# Patient Record
Sex: MALE | Race: WHITE | NOT HISPANIC OR LATINO | URBAN - METROPOLITAN AREA
[De-identification: names, ages, dates, MRNs, and addresses within clinical notes are randomized per-mention and may not be internally consistent; named-entity substitution may affect disease eponyms.]

---

## 2019-01-31 NOTE — PROGRESS NOTES
"SUBJECTIVE:   Simone Shepard is a 43 year old male and has had {NUMBERS 1-12:10} wart(s) for {NUMBERS 1-12:10} {TIME FRAME:9076} of the {LOCATION ON BODY - DERM WARTS:789884::\"***\"} and has tried {derm wart treatment:825670}.   The lesions are growing and occasionally painful.      Past Medical, social, family histories, medications, and allergies reviewed and updated    OBJECTIVE:    There were no vitals taken for this visit.  The patient appears today in no apparent distress and well nourished.    Exam reveals *** scattered skin colored to pink verrucous papules and plaques with black dots within consistent with verrucae on the { location on body - derm warts:678621::\"***\"}.      ASSESSMENT:  ***    PLAN:   All treatment options and potential side effects were discussed (including but not limited to: pain, vesicles/bullae formation, ring wart formation, recurrence, and need for retreatment). The fact that studies show the average number of treatments to get a wart to resolve is 5-6 treatments was discussed as well.  Discussed contacting insurance for treatment coverage.     The patient/parents elect to proceed with {derm wart treatment:024476}after paring down the lesions with a sterile scalpel blade.    Follow up  2 weeks    Gilberto Pope PA-C  "

## 2019-02-01 ENCOUNTER — OFFICE VISIT (OUTPATIENT)
Dept: FAMILY MEDICINE | Facility: CLINIC | Age: 44
End: 2019-02-01
Payer: COMMERCIAL

## 2019-02-01 VITALS
TEMPERATURE: 98.2 F | RESPIRATION RATE: 18 BRPM | OXYGEN SATURATION: 97 % | WEIGHT: 224 LBS | SYSTOLIC BLOOD PRESSURE: 112 MMHG | DIASTOLIC BLOOD PRESSURE: 74 MMHG | HEIGHT: 70 IN | HEART RATE: 68 BPM | BODY MASS INDEX: 32.07 KG/M2

## 2019-02-01 DIAGNOSIS — L84 CORN OR CALLUS: Primary | ICD-10-CM

## 2019-02-01 PROCEDURE — 17110 DESTRUCTION B9 LES UP TO 14: CPT | Performed by: PHYSICIAN ASSISTANT

## 2019-02-01 SDOH — HEALTH STABILITY: MENTAL HEALTH: HOW OFTEN DO YOU HAVE A DRINK CONTAINING ALCOHOL?: NEVER

## 2019-02-01 ASSESSMENT — MIFFLIN-ST. JEOR: SCORE: 1922.31

## 2019-02-01 ASSESSMENT — PAIN SCALES - GENERAL: PAINLEVEL: NO PAIN (1)

## 2019-02-01 NOTE — PROGRESS NOTES
"SUBJECTIVE:                                                    Simone Shepard is a 43 year old male who presents to clinic today for the following health issues:          Duration: weeks    Description (location/character/radiation): left foot 5th digit - lesion     Intensity:  Mild today     Accompanying signs and symptoms: was very painful but that has gotten better     History (similar episodes/previous evaluation): None    Precipitating or alleviating factors: None    Therapies tried and outcome: None       Problem list and histories reviewed & adjusted, as indicated.  Additional history: as documented    There is no problem list on file for this patient.    History reviewed. No pertinent surgical history.    Social History     Tobacco Use     Smoking status: Never Smoker     Smokeless tobacco: Never Used   Substance Use Topics     Alcohol use: Yes     Frequency: Never     Comment: rarely     Family History   Problem Relation Age of Onset     Lupus Mother          No current outpatient medications on file.     No Known Allergies    ROS:  Constitutional, HEENT, cardiovascular, pulmonary, gi and gu systems are negative, except as otherwise noted.    OBJECTIVE:     /74   Pulse 68   Temp 98.2  F (36.8  C) (Oral)   Resp 18   Ht 1.778 m (5' 10\")   Wt 101.6 kg (224 lb)   SpO2 97%   BMI 32.14 kg/m    Body mass index is 32.14 kg/m .  GENERAL: healthy, alert and no distress  Lateral aspect of left foot with very small corn    Diagnostic Test Results:  none     ASSESSMENT/PLAN:         ICD-10-CM    1. Corn or callus L84 DESTRUCT BENIGN LESION, UP TO 14      Corn shaved down with 15 blade. Use of liquid nitrogen was discussed. warning signs discussed. side effects discussed. It was used on skin lesion x 3.  varisol applied. Wash off in about 10 hrs if he can tolerate it.   Recheck 2 wks.     Gilberto Pope PA-C  [facility]    "

## 2020-03-03 ENCOUNTER — OFFICE VISIT (OUTPATIENT)
Dept: FAMILY MEDICINE | Facility: CLINIC | Age: 45
End: 2020-03-03
Payer: COMMERCIAL

## 2020-03-03 VITALS
OXYGEN SATURATION: 98 % | TEMPERATURE: 97.8 F | HEART RATE: 90 BPM | WEIGHT: 235 LBS | DIASTOLIC BLOOD PRESSURE: 72 MMHG | BODY MASS INDEX: 33.72 KG/M2 | SYSTOLIC BLOOD PRESSURE: 125 MMHG

## 2020-03-03 DIAGNOSIS — H00.11 CHALAZION OF RIGHT UPPER EYELID: Primary | ICD-10-CM

## 2020-03-03 PROCEDURE — 99213 OFFICE O/P EST LOW 20 MIN: CPT | Performed by: PHYSICIAN ASSISTANT

## 2020-03-03 NOTE — PROGRESS NOTES
Subjective     Simone Shepard is a 45 year old male who presents to clinic today for the following health issues:    HPI   Eye(s) Problem      Duration: Over a week. He has been using warm compresses as much as possible and there is a bump in the upper right eyelid. No drainage. Pressure / no actual pain.    Description:  Location: right  Pain: YES- weird pressure  Redness: YES  Discharge: YES / this morning watery.     Accompanying signs and symptoms:     History (Trauma, foreign body exposure,): None    Precipitating or alleviating factors (contact use): None    Therapies tried and outcome: None      There is no problem list on file for this patient.    History reviewed. No pertinent surgical history.    Social History     Tobacco Use     Smoking status: Never Smoker     Smokeless tobacco: Never Used   Substance Use Topics     Alcohol use: Yes     Frequency: Never     Comment: rarely     Family History   Problem Relation Age of Onset     Lupus Mother          No current outpatient medications on file.     No Known Allergies  BP Readings from Last 3 Encounters:   03/03/20 125/72   02/01/19 112/74    Wt Readings from Last 3 Encounters:   03/03/20 106.6 kg (235 lb)   02/01/19 101.6 kg (224 lb)                    Reviewed and updated as needed this visit by Provider         Review of Systems   ROS COMP: Constitutional, HEENT, cardiovascular, pulmonary, GI, , musculoskeletal, neuro, skin, endocrine and psych systems are negative, except as otherwise noted.      Objective    /72   Pulse 90   Temp 97.8  F (36.6  C) (Tympanic)   Wt 106.6 kg (235 lb)   SpO2 98%   BMI 33.72 kg/m    Body mass index is 33.72 kg/m .  Physical Exam   GENERAL: healthy, alert and no distress  EYES: right upper lid: red / swollen, there is a nodule present. No drainage.   Exam otherwise normal.   Left eye appears normal.     Diagnostic Test Results:  Labs reviewed in Epic        Assessment & Plan     1. Chalazion of right upper  eyelid  No improvement with warm compresses.   Referral given to schedule an appointment with Ophthalmology.   Continue warm compresses until he is seen.   - OPHTHALMOLOGY ADULT REFERRAL           Return in about 1 week (around 3/10/2020) for specialty / Ophthalmology, .    Kristen M. Kehr, PA-C  [facility]

## 2020-03-05 ENCOUNTER — OFFICE VISIT (OUTPATIENT)
Dept: OPHTHALMOLOGY | Facility: CLINIC | Age: 45
End: 2020-03-05
Payer: COMMERCIAL

## 2020-03-05 DIAGNOSIS — H00.11 CHALAZION OF RIGHT UPPER EYELID: Primary | ICD-10-CM

## 2020-03-05 PROCEDURE — 92002 INTRM OPH EXAM NEW PATIENT: CPT | Performed by: OPHTHALMOLOGY

## 2020-03-05 RX ORDER — CEPHALEXIN 500 MG/1
500 CAPSULE ORAL 2 TIMES DAILY
Qty: 20 CAPSULE | Refills: 0 | Status: SHIPPED | OUTPATIENT
Start: 2020-03-05 | End: 2020-06-11

## 2020-03-05 ASSESSMENT — VISUAL ACUITY
CORRECTION_TYPE: GLASSES
METHOD: SNELLEN - LINEAR
OD_CC: 20/20
OS_CC: 20/20
OD_CC+: -1

## 2020-03-05 ASSESSMENT — TONOMETRY
OD_IOP_MMHG: 14
IOP_METHOD: ICARE
OS_IOP_MMHG: 16

## 2020-03-05 ASSESSMENT — SLIT LAMP EXAM - LIDS: COMMENTS: NORMAL

## 2020-03-05 NOTE — PROGRESS NOTES
Current Eye Medications:  None. Has tried Warm compresses 1-2 times daily for about 10 minutes at a time, Doesn't seem to help.     Subjective:  Bump on right upper eyelid for a couple of weeks, changes in size. Vision is fine both eyes. No eye pain or discomfort in either eye.      Objective:  See Ophthalmology Exam.       Assessment:  Acute chalazion right upper eyelid.      Plan:   Start Keflex by mouth twice daily for 10 days.  Use warm packs on the eye for about 10 minutes a few times daily.  Lid hygiene discussed and encouraged for both eyes a few times daily as needed.   Return Visit End of Day Procedure (4:30) for Chalazion Incise and Drain if symptoms do not resolve after several weeks.    Fabio Dumas M.D.  [phone]

## 2020-03-05 NOTE — PATIENT INSTRUCTIONS
Start Keflex by mouth twice daily for 10 days.  Use warm packs on the eye for about 10 minutes a few times daily.  Lid hygiene discussed and encouraged for both eyes a few times daily as needed.   Return Visit End of Day Procedure (4:30) for Chalazion Incise and Drain if symptoms do not resolve after several weeks.    Fabio Dumas M.D.  [phone]

## 2020-03-05 NOTE — LETTER
3/5/2020         RE: Simone Shepard  [address]  [place] [address]        Dear Colleague,    Thank you for referring your patient, Simone Shepard, to the [facility]. Please see a copy of my visit note below.     Current Eye Medications:  None. Has tried Warm compresses 1-2 times daily for about 10 minutes at a time, Doesn't seem to help.     Subjective:  Bump on right upper eyelid for a couple of weeks, changes in size. Vision is fine both eyes. No eye pain or discomfort in either eye.      Objective:  See Ophthalmology Exam.       Assessment:  Acute chalazion right upper eyelid.      Plan:   Start Keflex by mouth twice daily for 10 days.  Use warm packs on the eye for about 10 minutes a few times daily.  Lid hygiene discussed and encouraged for both eyes a few times daily as needed.   Return Visit End of Day Procedure (4:30) for Chalazion Incise and Drain if symptoms do not resolve after several weeks.    Fabio Dumas M.D.  [phone]             Again, thank you for allowing me to participate in the care of your patient.        Sincerely,        Fabio Dumas MD

## 2020-03-06 ENCOUNTER — OFFICE VISIT (OUTPATIENT)
Dept: FAMILY MEDICINE | Facility: CLINIC | Age: 45
End: 2020-03-06
Payer: COMMERCIAL

## 2020-03-06 ENCOUNTER — ANCILLARY PROCEDURE (OUTPATIENT)
Dept: GENERAL RADIOLOGY | Facility: CLINIC | Age: 45
End: 2020-03-06
Attending: PHYSICIAN ASSISTANT
Payer: COMMERCIAL

## 2020-03-06 VITALS
TEMPERATURE: 98.7 F | SYSTOLIC BLOOD PRESSURE: 136 MMHG | HEIGHT: 70 IN | WEIGHT: 223 LBS | HEART RATE: 101 BPM | BODY MASS INDEX: 31.92 KG/M2 | OXYGEN SATURATION: 98 % | DIASTOLIC BLOOD PRESSURE: 83 MMHG

## 2020-03-06 DIAGNOSIS — B34.9 VIRAL SYNDROME: ICD-10-CM

## 2020-03-06 DIAGNOSIS — R05.9 COUGH: Primary | ICD-10-CM

## 2020-03-06 LAB
BASOPHILS # BLD AUTO: 0 10E9/L (ref 0–0.2)
BASOPHILS NFR BLD AUTO: 0.3 %
DIFFERENTIAL METHOD BLD: ABNORMAL
EOSINOPHIL # BLD AUTO: 0.1 10E9/L (ref 0–0.7)
EOSINOPHIL NFR BLD AUTO: 1 %
ERYTHROCYTE [DISTWIDTH] IN BLOOD BY AUTOMATED COUNT: 13.4 % (ref 10–15)
HCT VFR BLD AUTO: 48.8 % (ref 40–53)
HGB BLD-MCNC: 16.7 G/DL (ref 13.3–17.7)
LYMPHOCYTES # BLD AUTO: 2.3 10E9/L (ref 0.8–5.3)
LYMPHOCYTES NFR BLD AUTO: 19.8 %
MCH RBC QN AUTO: 29.7 PG (ref 26.5–33)
MCHC RBC AUTO-ENTMCNC: 34.2 G/DL (ref 31.5–36.5)
MCV RBC AUTO: 87 FL (ref 78–100)
MONOCYTES # BLD AUTO: 1.7 10E9/L (ref 0–1.3)
MONOCYTES NFR BLD AUTO: 14.5 %
NEUTROPHILS # BLD AUTO: 7.6 10E9/L (ref 1.6–8.3)
NEUTROPHILS NFR BLD AUTO: 64.4 %
PLATELET # BLD AUTO: 239 10E9/L (ref 150–450)
RBC # BLD AUTO: 5.63 10E12/L (ref 4.4–5.9)
WBC # BLD AUTO: 11.7 10E9/L (ref 4–11)

## 2020-03-06 PROCEDURE — 99213 OFFICE O/P EST LOW 20 MIN: CPT | Performed by: PHYSICIAN ASSISTANT

## 2020-03-06 PROCEDURE — 71046 X-RAY EXAM CHEST 2 VIEWS: CPT

## 2020-03-06 PROCEDURE — 36415 COLL VENOUS BLD VENIPUNCTURE: CPT | Performed by: PHYSICIAN ASSISTANT

## 2020-03-06 PROCEDURE — 85025 COMPLETE CBC W/AUTO DIFF WBC: CPT | Performed by: PHYSICIAN ASSISTANT

## 2020-03-06 ASSESSMENT — ENCOUNTER SYMPTOMS
CARDIOVASCULAR NEGATIVE: 1
DIAPHORESIS: 1
FATIGUE: 1
EYES NEGATIVE: 1
CHEST TIGHTNESS: 1
GASTROINTESTINAL NEGATIVE: 1
SHORTNESS OF BREATH: 0
MYALGIAS: 1
CHILLS: 1
SORE THROAT: 1
FEVER: 1

## 2020-03-06 ASSESSMENT — MIFFLIN-ST. JEOR: SCORE: 1907.77

## 2020-03-06 NOTE — PATIENT INSTRUCTIONS
"  Patient Education     Viral Syndrome (Adult)  A viral illness may cause a number of symptoms such as fever. Other symptoms depend on the part of the body that the virus affects. If it settles in your nose, throat, and lungs, it may cause cough, sore throat, congestion, runny nose, headache, earache and other ear symptoms, or shortness of breath. If it settles in your stomach and intestinal tract, it may cause nausea, vomiting, cramping, and diarrhea. Sometimes it causes generalized symptoms like \"aching all over,\" feeling tired, loss of energy, or loss of appetite.  A viral illness usually lasts anywhere from several days to several weeks, but sometimes it lasts longer. In some cases, a more serious infection can look like a viral syndrome in the first few days of the illness. You may need another exam and additional tests to know the difference. Watch for the warning signs listed below for when to seek medical advice.  Home care  Follow these guidelines for taking care of yourself at home:    If symptoms are severe, rest at home for the first 2 to 3 days.    Stay away from cigarette smoke - both your smoke and the smoke from others.    You may use over-the-counter acetaminophen or ibuprofen for fever, muscle aching, and headache, unless another medicine was prescribed for this. If you have chronic liver or kidney disease or ever had a stomach ulcer or gastrointestinal bleeding, talk with your healthcare provider before using these medicines. No one who is younger than 18 and ill with a fever should take aspirin. It may cause severe disease or death.    Your appetite may be poor, so a light diet is fine. Avoid dehydration by drinking 8 to 12, 8-ounce glasses of fluids each day. This may include water; orange juice; lemonade; apple, grape, and cranberry juice; clear fruit drinks; electrolyte replacement and sports drinks; and decaffeinated teas and coffee. If you have been diagnosed with a kidney disease, ask your " healthcare provider how much and what types of fluids you should drink to prevent dehydration. If you have kidney disease, drinking too much fluid can cause it build up in the your body and be dangerous to your health.    Over-the-counter remedies won't shorten the length of the illness but may be helpful for symptoms such as cough, sore throat, nasal and sinus congestion, or diarrhea. Don't use decongestants if you have high blood pressure.  Follow-up care  Follow up with your healthcare provider if you do not improve over the next week.  Call 911  Call 911 if any of the following occur:    Convulsion    Feeling weak, dizzy, or like you are going to faint    Chest pain, or more than mild shortness of breath  When to seek medical advice  Call your healthcare provider right away if any of these occur:    Cough with lots of colored sputum (mucus) or blood in your sputum    Chest pain, shortness of breath, wheezing, or trouble breathing    Severe headache; face, neck, or ear pain    Severe, constant pain in the lower right side of your belly (abdominal)    Continued vomiting (can t keep liquids down)    Frequent diarrhea (more than 5 times a day); blood (red or black color) or mucus in diarrhea    Feeling weak, dizzy, or like you are going to faint    Extreme thirst    Fever of 100.4 F (38 C) or higher, or as directed by your healthcare provider  Date Last Reviewed: 4/1/2018 2000-2019 [employer]. [address] [place] [id number]. All rights reserved. This information is not intended as a substitute for professional medical care. Always follow your healthcare professional's instructions.

## 2020-03-06 NOTE — PROGRESS NOTES
"SUBJECTIVE:   Simone Shepard is a 45 year old male presenting with a chief complaint of   Chief Complaint   Patient presents with     Fever     few days ago     Generalized Body Aches     feels chest tightness- but no cough     Fatigue     and loss of appetite        He is an established patient of [facility].  Patient presents with symptoms x 2 days.  Patient state tmax of 101.2, chest tightness and ST.      URI Adult    Onset of symptoms was 2 day(s) ago.  Course of illness is same.    Severity moderate  Current and Associated symptoms: fever, chills, sweats, sore throat, body aches and fatigue  Treatment measures tried include dayquil and niquil.  Predisposing factors include None.        Review of Systems   Constitutional: Positive for chills, diaphoresis, fatigue and fever.   HENT: Positive for sore throat.    Eyes: Negative.    Respiratory: Positive for chest tightness. Negative for shortness of breath.    Cardiovascular: Negative.    Gastrointestinal: Negative.    Genitourinary: Negative.    Musculoskeletal: Positive for myalgias.   Skin: Negative.    All other systems reviewed and are negative.      History reviewed. No pertinent past medical history.  Family History   Problem Relation Age of Onset     Lupus Mother      Current Outpatient Medications   Medication Sig Dispense Refill     cephALEXin (KEFLEX) 500 MG capsule Take 1 capsule (500 mg) by mouth 2 times daily for 10 days 20 capsule 0     Social History     Tobacco Use     Smoking status: Never Smoker     Smokeless tobacco: Never Used   Substance Use Topics     Alcohol use: Yes     Frequency: Never     Comment: rarely       OBJECTIVE  /83   Pulse 101   Temp 98.7  F (37.1  C) (Oral)   Ht 1.778 m (5' 10\")   Wt 101.2 kg (223 lb)   SpO2 98%   BMI 32.00 kg/m      Physical Exam  Vitals signs and nursing note reviewed.   Constitutional:       General: He is not in acute distress.     Appearance: Normal appearance. He is normal weight. He is not " ill-appearing, toxic-appearing or diaphoretic.   HENT:      Head: Normocephalic and atraumatic.      Right Ear: Tympanic membrane, ear canal and external ear normal.      Left Ear: Tympanic membrane, ear canal and external ear normal.      Mouth/Throat:      Mouth: Mucous membranes are moist.      Pharynx: Oropharynx is clear.   Eyes:      Extraocular Movements: Extraocular movements intact.      Conjunctiva/sclera: Conjunctivae normal.   Neck:      Musculoskeletal: Normal range of motion and neck supple.   Cardiovascular:      Rate and Rhythm: Normal rate and regular rhythm.      Pulses: Normal pulses.      Heart sounds: Normal heart sounds.   Pulmonary:      Effort: Pulmonary effort is normal. No respiratory distress.      Breath sounds: Normal breath sounds. No stridor. No wheezing, rhonchi or rales.   Skin:     General: Skin is warm and dry.      Capillary Refill: Capillary refill takes less than 2 seconds.   Neurological:      General: No focal deficit present.      Mental Status: He is alert.   Psychiatric:         Mood and Affect: Mood normal.         Behavior: Behavior normal.         Labs:  Results for orders placed or performed in visit on 03/06/20 (from the past 24 hour(s))   CBC with platelets and differential   Result Value Ref Range    WBC 11.7 (H) 4.0 - 11.0 10e9/L    RBC Count 5.63 4.4 - 5.9 10e12/L    Hemoglobin 16.7 13.3 - 17.7 g/dL    Hematocrit 48.8 40.0 - 53.0 %    MCV 87 78 - 100 fl    MCH 29.7 26.5 - 33.0 pg    MCHC 34.2 31.5 - 36.5 g/dL    RDW 13.4 10.0 - 15.0 %    Platelet Count 239 150 - 450 10e9/L    % Neutrophils 64.4 %    % Lymphocytes 19.8 %    % Monocytes 14.5 %    % Eosinophils 1.0 %    % Basophils 0.3 %    Absolute Neutrophil 7.6 1.6 - 8.3 10e9/L    Absolute Lymphocytes 2.3 0.8 - 5.3 10e9/L    Absolute Monocytes 1.7 (H) 0.0 - 1.3 10e9/L    Absolute Eosinophils 0.1 0.0 - 0.7 10e9/L    Absolute Basophils 0.0 0.0 - 0.2 10e9/L    Diff Method Automated Method    XR Chest 2 Views     "Narrative    XR CHEST TWO VIEWS   3/6/2020 3:00 PM     HISTORY: Cough.    COMPARISON: None available      Impression    IMPRESSION: No acute airspace disease.       X-Ray was done, my findings are: NAD  Xrays personally viewed by myself.      ASSESSMENT:      ICD-10-CM    1. Cough R05 CBC with platelets and differential     XR Chest 2 Views   2. Viral syndrome B34.9         Medical Decision Making:    Differential Diagnosis:  URI Adult/Peds:  Pneumonia, Viral syndrome and Viral upper respiratory illness    Serious Comorbid Conditions:  Adult:  None    PLAN:    URI Adult:  Tylenol, Ibuprofen, Fluids, Rest and continue with keflex.      Followup:    If not improving or if condition worsens, follow up with your Primary Care Provider    Patient Instructions     Patient Education     Viral Syndrome (Adult)  A viral illness may cause a number of symptoms such as fever. Other symptoms depend on the part of the body that the virus affects. If it settles in your nose, throat, and lungs, it may cause cough, sore throat, congestion, runny nose, headache, earache and other ear symptoms, or shortness of breath. If it settles in your stomach and intestinal tract, it may cause nausea, vomiting, cramping, and diarrhea. Sometimes it causes generalized symptoms like \"aching all over,\" feeling tired, loss of energy, or loss of appetite.  A viral illness usually lasts anywhere from several days to several weeks, but sometimes it lasts longer. In some cases, a more serious infection can look like a viral syndrome in the first few days of the illness. You may need another exam and additional tests to know the difference. Watch for the warning signs listed below for when to seek medical advice.  Home care  Follow these guidelines for taking care of yourself at home:    If symptoms are severe, rest at home for the first 2 to 3 days.    Stay away from cigarette smoke - both your smoke and the smoke from others.    You may use " over-the-counter acetaminophen or ibuprofen for fever, muscle aching, and headache, unless another medicine was prescribed for this. If you have chronic liver or kidney disease or ever had a stomach ulcer or gastrointestinal bleeding, talk with your healthcare provider before using these medicines. No one who is younger than 18 and ill with a fever should take aspirin. It may cause severe disease or death.    Your appetite may be poor, so a light diet is fine. Avoid dehydration by drinking 8 to 12, 8-ounce glasses of fluids each day. This may include water; orange juice; lemonade; apple, grape, and cranberry juice; clear fruit drinks; electrolyte replacement and sports drinks; and decaffeinated teas and coffee. If you have been diagnosed with a kidney disease, ask your healthcare provider how much and what types of fluids you should drink to prevent dehydration. If you have kidney disease, drinking too much fluid can cause it build up in the your body and be dangerous to your health.    Over-the-counter remedies won't shorten the length of the illness but may be helpful for symptoms such as cough, sore throat, nasal and sinus congestion, or diarrhea. Don't use decongestants if you have high blood pressure.  Follow-up care  Follow up with your healthcare provider if you do not improve over the next week.  Call 911  Call 911 if any of the following occur:    Convulsion    Feeling weak, dizzy, or like you are going to faint    Chest pain, or more than mild shortness of breath  When to seek medical advice  Call your healthcare provider right away if any of these occur:    Cough with lots of colored sputum (mucus) or blood in your sputum    Chest pain, shortness of breath, wheezing, or trouble breathing    Severe headache; face, neck, or ear pain    Severe, constant pain in the lower right side of your belly (abdominal)    Continued vomiting (can t keep liquids down)    Frequent diarrhea (more than 5 times a day); blood  (red or black color) or mucus in diarrhea    Feeling weak, dizzy, or like you are going to faint    Extreme thirst    Fever of 100.4 F (38 C) or higher, or as directed by your healthcare provider  Date Last Reviewed: 4/1/2018 2000-2019 [employer]. [address] [place] [id number]. All rights reserved. This information is not intended as a substitute for professional medical care. Always follow your healthcare professional's instructions.

## 2020-06-10 ENCOUNTER — TELEPHONE (OUTPATIENT)
Dept: FAMILY MEDICINE | Facility: CLINIC | Age: 45
End: 2020-06-10

## 2020-06-10 NOTE — TELEPHONE ENCOUNTER
Provider requested I contact patient.  He has a video visit with her at 7am tomorrow for a leg injury.  ? If shouldn't be seen instead.   I called and spoke with Simone.  He states that at 5pm Monday night he sustained a left leg injury at work.  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  An open bed truck had backed up to the loading dock.  Patient states was loading/unloading (unsure which he had said he was doing) and he stepped sideways into open space between truck and loading dock with left leg.  He scraped his leg from mid calf to lower thigh on the mechanisms on the back of truck.  States has abrasions and contusions.  States no open gashes but more bruising occurring yet today.  He states laid there for awhile and then slowly got up.  Is able to walk and extend his leg outward but states can't bring his heel up to touch his buttocks.  He did file a report with work and then went home and iced. He states pictures were taken of the injury.  He did go back to work.  Has not had injury assessed by work comp or any other healthcare provider.  He is union.  I did let him know that his injury cannot be assessed through a virtual visit; he needs to be seen in clinic for assessment.  I did advise him to speak with his HR regarding how to get an appointment with [place] for assessment of his work injury.  He states that he can do that but would like to see someone at clinic.  I had checked with our walk in sports medicine at [place].  They are not doing walk in appts; needs to call and then scheduling will help them make an appointment; may not be same day.  I did give him their scheduling number to call and see how soon he can get in with orthopedics.  I also set him up tomorrow afternoon with appointment at [facility] with Gilberto Pope PA-C at 1:50pm.  Instructed to arrive 15 min early and bring his work comp insurance information if processing as a work injury.  COVID screening negative.  He thanked me for all of my  help.  Aware no 7am video visit tomorrow.  He will check with his work/HR regarding [facility] yet today.  If not needing appointment with Gilberto Pope PA-C he will call and cancel.  OR if able to get in with orthopedics at [facility] he will cancel appointment with Gilberto Pope PA-C.  Shannen Reilly RN

## 2020-06-10 NOTE — PROGRESS NOTES
"SUBJECTIVE:   Simone Shepard is a 45 year old male seen here today for his left leg.   What happened: at work  - leg fell between a truck and dock.       Onset: 4 days    Description:   Character: bruising and swelling around knee, pain and bruising in foot     Intensity: moderate    Progression of Symptoms: same    Accompanying Signs & Symptoms:  Other symptoms:   Pain with sitting to standing and walking. Loss of motion.    History:   Previous similar pain: no       Precipitating factors:   Trauma or overuse: YES    Alleviating factors:  Improved by: rest/inactivity       Therapies Tried and outcome: none      ROS:  Constitutional, eye, ENT, skin, respiratory, cardiac, and GI are normal except as otherwise noted.    PFSH:  Past Medical, social, family histories, medications, and allergies were reviewed and updated.     OBJECTIVE:  /76   Pulse 70   Temp 97  F (36.1  C) (Tympanic)   Resp 16   Ht 1.778 m (5' 10\")   Wt 103.9 kg (229 lb)   SpO2 98%   BMI 32.86 kg/m    General:  Simone is awake, alert, and cooperative.  NAD.  Left leg: Diffuse contusion on the medial knee and upper tibia.  Also with slight swelling.  No tenderness.  Calves are soft nontender.  He has some swelling and ecchymosis around his medial ankle with full range of motion and nontender.  He is neurovascular intact distally.  Knee with and hip with full range of motion ligaments are stable valgus varus and Lachman's.  He has a negative Frederick's.  He has some slight abrasion on the medial tibial region.      X-rays of the left lower leg were negative pending radiology report.    ASSESSMENT:     ICD-10-CM    1. Pain of left lower leg  M79.662 XR Tibia & Fibula Left 2 Views       PLAN:   ice or cold packs 20 minutes every 2-3 hrs as needed to relieve pain and swelling, for the first 2 days. Then can apply heat 20 minutes every 2-3 hrs (avoid sleeping on heating pad) there after as needed.   If you can tolerate, start non-steroidal " anti-inflammatory medication like: Ibuprofen 600-800 mg three times daily or Aleve 2 tablets of over the counter strength twice a day as needed.   Tylenol can help with pain also.    Active range of motion exercises encouraged  Activity modification trying to avoid activities that cause you pain.   Follow up 2 weeks as needed.  A work note was provided to return to work with no restrictions.    Gilberto Pope PA-C

## 2020-06-11 ENCOUNTER — ANCILLARY PROCEDURE (OUTPATIENT)
Dept: GENERAL RADIOLOGY | Facility: CLINIC | Age: 45
End: 2020-06-11
Attending: PHYSICIAN ASSISTANT
Payer: COMMERCIAL

## 2020-06-11 ENCOUNTER — OFFICE VISIT (OUTPATIENT)
Dept: FAMILY MEDICINE | Facility: CLINIC | Age: 45
End: 2020-06-11
Payer: COMMERCIAL

## 2020-06-11 VITALS
RESPIRATION RATE: 16 BRPM | TEMPERATURE: 97 F | BODY MASS INDEX: 32.78 KG/M2 | WEIGHT: 229 LBS | SYSTOLIC BLOOD PRESSURE: 118 MMHG | HEART RATE: 70 BPM | OXYGEN SATURATION: 98 % | HEIGHT: 70 IN | DIASTOLIC BLOOD PRESSURE: 76 MMHG

## 2020-06-11 DIAGNOSIS — M79.662 PAIN OF LEFT LOWER LEG: Primary | ICD-10-CM

## 2020-06-11 DIAGNOSIS — M79.662 PAIN OF LEFT LOWER LEG: ICD-10-CM

## 2020-06-11 PROCEDURE — 99213 OFFICE O/P EST LOW 20 MIN: CPT | Performed by: PHYSICIAN ASSISTANT

## 2020-06-11 PROCEDURE — 73590 X-RAY EXAM OF LOWER LEG: CPT | Mod: LT

## 2020-06-11 ASSESSMENT — MIFFLIN-ST. JEOR: SCORE: 1934.99

## 2020-06-11 NOTE — PATIENT INSTRUCTIONS
ice or cold packs 20 minutes every 2-3 hrs as needed to relieve pain and swelling, for the first 2 days. Then can apply heat 20 minutes every 2-3 hrs (avoid sleeping on heating pad) there after as needed.   If you can tolerate, start non-steroidal anti-inflammatory medication like: Ibuprofen 600-800 mg three times daily or Aleve 2 tablets of over the counter strength twice a day as needed.   Tylenol can help with pain also.    Active range of motion exercises encouraged  Activity modification trying to avoid activities that cause you pain.

## 2020-06-11 NOTE — LETTER
[facility]  [address] [facility] [address]  Phone: [phone]    June 11, 2020        Simone Shepard  [address]   [facility] [address]          To whom it may concern:    RE: Simone Shepard    Patient was seen and treated today at our clinic and missed work. Simone Shepard is able to work with no restrictions 6/11/2020.     Please contact me for questions or concerns.      Sincerely,        Gilberto Pope PA-C

## 2021-01-03 ENCOUNTER — HEALTH MAINTENANCE LETTER (OUTPATIENT)
Age: 46
End: 2021-01-03

## 2021-10-10 ENCOUNTER — HEALTH MAINTENANCE LETTER (OUTPATIENT)
Age: 46
End: 2021-10-10

## 2022-01-29 ENCOUNTER — HEALTH MAINTENANCE LETTER (OUTPATIENT)
Age: 47
End: 2022-01-29

## 2022-09-18 ENCOUNTER — HEALTH MAINTENANCE LETTER (OUTPATIENT)
Age: 47
End: 2022-09-18

## 2022-11-07 ENCOUNTER — OFFICE VISIT (OUTPATIENT)
Dept: FAMILY MEDICINE | Facility: CLINIC | Age: 47
End: 2022-11-07
Payer: COMMERCIAL

## 2022-11-07 VITALS
HEART RATE: 93 BPM | BODY MASS INDEX: 31.64 KG/M2 | WEIGHT: 221 LBS | TEMPERATURE: 97.5 F | HEIGHT: 70 IN | SYSTOLIC BLOOD PRESSURE: 113 MMHG | OXYGEN SATURATION: 97 % | DIASTOLIC BLOOD PRESSURE: 77 MMHG

## 2022-11-07 DIAGNOSIS — Z13.1 SCREENING FOR DIABETES MELLITUS: ICD-10-CM

## 2022-11-07 DIAGNOSIS — Z13.220 SCREENING FOR HYPERLIPIDEMIA: ICD-10-CM

## 2022-11-07 DIAGNOSIS — Z11.59 NEED FOR HEPATITIS C SCREENING TEST: ICD-10-CM

## 2022-11-07 DIAGNOSIS — Z12.11 SCREEN FOR COLON CANCER: ICD-10-CM

## 2022-11-07 DIAGNOSIS — R06.83 SNORING: ICD-10-CM

## 2022-11-07 DIAGNOSIS — Z11.4 SCREENING FOR HIV (HUMAN IMMUNODEFICIENCY VIRUS): ICD-10-CM

## 2022-11-07 DIAGNOSIS — Z00.00 ROUTINE GENERAL MEDICAL EXAMINATION AT A HEALTH CARE FACILITY: Primary | ICD-10-CM

## 2022-11-07 LAB
ANION GAP SERPL CALCULATED.3IONS-SCNC: 4 MMOL/L (ref 3–14)
BUN SERPL-MCNC: 12 MG/DL (ref 7–30)
CALCIUM SERPL-MCNC: 9.3 MG/DL (ref 8.5–10.1)
CHLORIDE BLD-SCNC: 104 MMOL/L (ref 94–109)
CHOLEST SERPL-MCNC: 172 MG/DL
CO2 SERPL-SCNC: 30 MMOL/L (ref 20–32)
CREAT SERPL-MCNC: 0.82 MG/DL (ref 0.66–1.25)
FASTING STATUS PATIENT QL REPORTED: YES
GFR SERPL CREATININE-BSD FRML MDRD: >90 ML/MIN/1.73M2
GLUCOSE BLD-MCNC: 122 MG/DL (ref 70–99)
HCV AB SERPL QL IA: NONREACTIVE
HDLC SERPL-MCNC: 45 MG/DL
HIV 1+2 AB+HIV1 P24 AG SERPL QL IA: NONREACTIVE
LDLC SERPL CALC-MCNC: 105 MG/DL
NONHDLC SERPL-MCNC: 127 MG/DL
POTASSIUM BLD-SCNC: 4.3 MMOL/L (ref 3.4–5.3)
SODIUM SERPL-SCNC: 138 MMOL/L (ref 133–144)
TRIGL SERPL-MCNC: 111 MG/DL

## 2022-11-07 PROCEDURE — 80061 LIPID PANEL: CPT | Performed by: NURSE PRACTITIONER

## 2022-11-07 PROCEDURE — 99396 PREV VISIT EST AGE 40-64: CPT | Performed by: NURSE PRACTITIONER

## 2022-11-07 PROCEDURE — 36415 COLL VENOUS BLD VENIPUNCTURE: CPT | Performed by: NURSE PRACTITIONER

## 2022-11-07 PROCEDURE — 86803 HEPATITIS C AB TEST: CPT | Performed by: NURSE PRACTITIONER

## 2022-11-07 PROCEDURE — 87389 HIV-1 AG W/HIV-1&-2 AB AG IA: CPT | Performed by: NURSE PRACTITIONER

## 2022-11-07 PROCEDURE — 80048 BASIC METABOLIC PNL TOTAL CA: CPT | Performed by: NURSE PRACTITIONER

## 2022-11-07 ASSESSMENT — ENCOUNTER SYMPTOMS
CHILLS: 0
JOINT SWELLING: 0
HEADACHES: 0
PALPITATIONS: 0
SORE THROAT: 1
SHORTNESS OF BREATH: 0
WEAKNESS: 0
ARTHRALGIAS: 0
FREQUENCY: 0
PARESTHESIAS: 0
DIZZINESS: 0
NAUSEA: 0
DYSURIA: 0
ABDOMINAL PAIN: 0
COUGH: 0
DIARRHEA: 0
FEVER: 0
HEMATOCHEZIA: 0
HEARTBURN: 0
CONSTIPATION: 0
EYE PAIN: 0
HEMATURIA: 0
NERVOUS/ANXIOUS: 0
MYALGIAS: 0

## 2022-11-07 NOTE — PATIENT INSTRUCTIONS
Vasectomy- make an appointment with Gilberto Pope PA-C for consultation.   Referral to sleep medicine for sleep apnea work-up.     I'll be in touch with lab results.   Referral for colonoscopy, you'll be contacted to set that up.          Preventive Health Recommendations  Male Ages 40 to 49    Yearly exam:             See your health care provider every year in order to  o   Review health changes.   o   Discuss preventive care.    o   Review your medicines if your doctor has prescribed any.  You should be tested each year for STDs (sexually transmitted diseases) if you re at risk.   Have a cholesterol test every 5 years.   Have a colonoscopy (test for colon cancer) if someone in your family has had colon cancer or polyps before age 50.   After age 45, have a diabetes test (fasting glucose). If you are at risk for diabetes, you should have this test every 3 years.    Talk with your health care provider about whether or not a prostate cancer screening test (PSA) is right for you.    Shots: Get a flu shot each year. Get a tetanus shot every 10 years.     Nutrition:  Eat at least 5 servings of fruits and vegetables daily.   Eat whole-grain bread, whole-wheat pasta and brown rice instead of white grains and rice.   Get adequate Calcium and Vitamin D.     Lifestyle  Exercise for at least 150 minutes a week (30 minutes a day, 5 days a week). This will help you control your weight and prevent disease.   Limit alcohol to one drink per day.   No smoking.   Wear sunscreen to prevent skin cancer.   See your dentist every six months for an exam and cleaning.

## 2022-11-08 DIAGNOSIS — R73.09 ELEVATED GLUCOSE: Primary | ICD-10-CM

## 2022-11-17 ENCOUNTER — TELEPHONE (OUTPATIENT)
Dept: GASTROENTEROLOGY | Facility: CLINIC | Age: 47
End: 2022-11-17

## 2022-11-17 NOTE — TELEPHONE ENCOUNTER
YScreening Questions  BLUE  KIND OF PREP RED  LOCATION [review exclusion criteria] GREEN  SEDATION TYPE        Y Are you active on mychart?       Raquel Ward, TOSHIA  Ordering/Referring Provider?        BCBS What type of coverage do you have?      N Have you had a positive covid test in the last 90 days?     31.7 1. BMI  [BMI 40+ - review exclusion criteria]    Y  2. Are you able to give consent for your medical care? [IF NO,RN REVIEW]        N  3. Are you taking any prescription pain medications on a routine schedule?      NA  3a. EXTENDED PREP What kind of prescription?     N 4. Do you have any chemical dependencies such as alcohol, street drugs, or methadone?    N 5. Do you have any history of post-traumatic stress syndrome, severe anxiety or history of psychosis?      **If yes 3- 5 , please schedule with MAC sedation.**          IF YES TO ANY 6 - 10 - HOSPITAL SETTING ONLY.     N 6.   Do you need assistance transferring?     N 7.   Have you had a heart or lung transplant?    N 8.   Are you currently on dialysis?   N 9.   Do you use daily home oxygen?   N 10. Do you take nitroglycerin?   10a. NA If yes, how often?     11. [FEMALES]  NA Are you currently pregnant?    11a. NA If yes, how many weeks? [ Greater than 12 weeks, OR NEEDED]    N 12. Do you have Pulmonary Hypertension? *NEED PAC APPT AT [facility]*     N 13. [review exclusion criteria]  Do you have any implantable devices in your body (pacemaker, defib, LVAD)?    N 14. In the past 6 months, have you had any heart related issues including cardiomyopathy or heart attack?     14a. NA If yes, did it require cardiac stenting if so when?     N 15. Have you had a stroke or Transient ischemic attack (TIA - aka  mini stroke ) within 6 months?      N 16. Do you have mod to severe Obstructive Sleep Apnea?  [Hospital only - Ok at [place]]    N 17. Do you have SEVERE AND UNCONTROLLED asthma? *NEED PAC APPT AT [facility]*     N 18. Are you currently taking any blood  "thinners?     18a. If yes, inform patient to \"follow up w/ ordering provider for bridging instructions.\"    N 19. Do you take the medication Phentermine?    19a. If yes, \"Hold for 7 days before procedure.  Please consult your prescribing provider if you have questions about holding this medication.\"     N  20. Do you have chronic kidney disease?      N  21. Do you have a diagnosis of diabetes?     N  22. On a regular basis do you go 3-5 days between bowel movements?      23. Preferred LOCAL Pharmacy for Pre Prescription    [ LIST ONLY ONE PHARMACY]     [id number] [address]      - CLOSING REMINDERS -    Informed patient they will need an adult    Cannot take any type of public or medical transportation alone    Conscious Sedation- Needs  for 6 hours after the procedure       MAC/General-Needs  for 24 hours after procedure    Pre-Procedure Covid test to be completed [[facility] PCR Testing Required]    Confirmed Nurse will call to complete assessment       - SCHEDULING DETAILS -     ELMA  Surgeon    02/24/23  Date of Procedure  Lower Endoscopy [Colonoscopy]  Type of Procedure Scheduled  [facility]   STANDARD [facility]-If you answer yes to questions #8, #20, #21Which Colonoscopy Prep was Sent?     MODERATE Sedation Type     N PAC / Pre-op Required         Additional comments:            "

## 2022-12-20 ENCOUNTER — TELEPHONE (OUTPATIENT)
Dept: GASTROENTEROLOGY | Facility: CLINIC | Age: 47
End: 2022-12-20

## 2022-12-20 NOTE — TELEPHONE ENCOUNTER
Caller:   Reason for Reschedule/Cancellation (please be detailed, any staff messages or encounters to note?): FLOATING CASE STEEVENS DIDN'T HAVE A BLOCK      Prior to reschedule please review:    Ordering Provider:RICHARD HAGAN    Sedation per order:CS    Does patient have any ASC Exclusions, please identify?:       Notes on Cancelled Procedure:    Procedure:Lower Endoscopy [Colonoscopy]     Date: 2/24    Location:[facility]; [address], [place] [address]    Surgeon: ELMA        Rescheduled: Yes    Procedure: Lower Endoscopy [Colonoscopy]     Date: 2/24    Location:[facility]; [address], [place] [address]    Surgeon: CHANDANA    Sedation Level Scheduled  CS,  Reason for Sedation Level     Prep/Instructions updated and sent: NO

## 2023-02-10 ENCOUNTER — TELEPHONE (OUTPATIENT)
Dept: GASTROENTEROLOGY | Facility: CLINIC | Age: 48
End: 2023-02-10
Payer: COMMERCIAL

## 2023-02-10 DIAGNOSIS — Z12.11 ENCOUNTER FOR SCREENING COLONOSCOPY: Primary | ICD-10-CM

## 2023-02-10 RX ORDER — BISACODYL 5 MG
TABLET, DELAYED RELEASE (ENTERIC COATED) ORAL
Qty: 4 TABLET | Refills: 0 | Status: SHIPPED | OUTPATIENT
Start: 2023-02-10 | End: 2023-02-24

## 2023-02-10 NOTE — TELEPHONE ENCOUNTER
Patient scheduled for Colonoscopy  on 02.24.2023.     Message sent to anesthesia-patient had a sleep study ordered but has not completed.     Discuss Covid policy.     Pre op exam scheduled: N/A    Arrival time: 0800. Procedure time 0845    Facility location: [facility]; [address], [place] [address]    Sedation type: MAC    Anticoagulations? No    Electronic implanted devices? No    Diabetic? No    Indication for procedure: screening colonoscopy    Bowel prep recommendation: Standard Golytely     Prep instructions need to be sent via [employer] Bowel prep script needs to be sent to [facility] [address] [place] [address] [facility]    Pre visit planning completed.    Violetta Marroquin RN  Endoscopy Procedure Pre Assessment RN

## 2023-02-10 NOTE — TELEPHONE ENCOUNTER
Yahir Tracey MD Soldo, Jennifer, RN  Ok to proceed prior to sleep study           Previous Messages     ----- Message -----   From: Violetta Marroquin RN   Sent: 2/10/2023  11:16 AM CST   To: Mg Dunbar Review   Subject: Sleep study                                       Please review and advise to determine if patient can proceed with scheduled procedure or if a hospital setting is recommended.     Patient is scheduled for a Colonoscopy   Date of procedure: 02.24.2023   Indication: Screening colonoscopy   Sedation type: MAC     Reason for review: Patient has order for sleep study from 11.07.2022 that he has not completed. Ok to proceed with procedure?       Thank you,   Violetta Marroquin RN   Endoscopy Procedure Pre Assessment RN   [phone] option 4

## 2023-02-10 NOTE — TELEPHONE ENCOUNTER
Received approval from anesthesia to proceed before completing sleep study.  See previous message.    Attempted to contact patient regarding upcoming Colonoscopy  procedure on 02.24.2023 for pre assessment questions. No answer.     Left message to return call to [phone] #4    Discuss Covid policy and designated  policy.    Pre op exam scheduled: N/A    Arrival time: 0800. Procedure time: 0845    Facility location: [facility]; [address], [place] [address]    Sedation type: MAC    Anticoagulants: No    Electronic implanted devices? No    Diabetic? No    Indication for procedure: screening colonoscopy    Bowel prep recommendation: Standard Golytely      Prep instructions sent via ProTip. Bowel prep script sent to [facility] [address] [facility]      Violetta Marroquin RN  Endoscopy Procedure Pre Assessment RN

## 2023-02-20 NOTE — TELEPHONE ENCOUNTER
Pre assessment questions completed for upcoming Colonoscopy  procedure scheduled on 2/24/23    COVID policy reviewed.     Reviewed procedural arrival time 0800, procedure time 0845 and facility location [facility]; [address], [place] [address]    Designated  policy reviewed. Instructed to have someone stay 24 hours post procedure.     Anticoagulation/blood thinners? No    Electronic implanted devices? No    Reviewed procedure prep instructions.     Patient verbalized understanding and had no questions or concerns at this time.    Janet Clarke RN  Endoscopy Procedure Pre Assessment RN

## 2023-02-20 NOTE — TELEPHONE ENCOUNTER
Second attempt for pre-assessment prior to upcoming colonoscopy      No answer.  Left message to return call [phone] #4    Sent OLSET message.    Violetta Marroquin RN  Endoscopy Procedure Pre Assessment RN

## 2023-02-23 ENCOUNTER — ANESTHESIA EVENT (OUTPATIENT)
Dept: SURGERY | Facility: AMBULATORY SURGERY CENTER | Age: 48
End: 2023-02-23
Payer: COMMERCIAL

## 2023-02-23 NOTE — ANESTHESIA PREPROCEDURE EVALUATION
Anesthesia Pre-Procedure Evaluation    Patient: Simone Shepard   MRN: [id number] : 1975        Procedure : Procedure(s):  COLONOSCOPY, WITH CO2 INSUFFLATION          No past medical history on file.   No past surgical history on file.   No Known Allergies   Social History     Tobacco Use     Smoking status: Never     Smokeless tobacco: Never   Substance Use Topics     Alcohol use: Yes     Comment: rarely      Wt Readings from Last 1 Encounters:   22 100.2 kg (221 lb)        Anesthesia Evaluation            ROS/MED HX  ENT/Pulmonary:  - neg pulmonary ROS     Neurologic:  - neg neurologic ROS     Cardiovascular:     (+) -----Irregular Heartbeat/Palpitations, Previous cardiac testing   Echo: Date: Results:    Stress Test: Date: Results:    ECG Reviewed: Date: 2023 Results:  SR, 70/min, frequent PVCs  Cath: Date: Results:      METS/Exercise Tolerance: >4 METS    Hematologic:  - neg hematologic  ROS     Musculoskeletal:  - neg musculoskeletal ROS     GI/Hepatic:  - neg GI/hepatic ROS     Renal/Genitourinary:  - neg Renal ROS     Endo:     (+) Obesity,     Psychiatric/Substance Use:  - neg psychiatric ROS     Infectious Disease:  - neg infectious disease ROS     Malignancy:  - neg malignancy ROS     Other:  - neg other ROS          Physical Exam    Airway  airway exam normal      Mallampati: I   TM distance: > 3 FB   Neck ROM: full   Mouth opening: > 3 cm    Respiratory Devices and Support         Dental       (+) Minor Abnormalities - some fillings, tiny chips      Cardiovascular       Comment: PVCs   Rhythm and rate: irregular and normal     Pulmonary   pulmonary exam normal                OUTSIDE LABS:  CBC:   Lab Results   Component Value Date    WBC 11.7 (H) 2020    HGB 16.7 2020    HCT 48.8 2020     2020     BMP:   Lab Results   Component Value Date     2022    POTASSIUM 4.3 2022    CHLORIDE 104 2022    CO2 30 2022    BUN 12  11/07/2022    CR 0.82 11/07/2022     (H) 11/07/2022     COAGS: No results found for: PTT, INR, FIBR  POC: No results found for: BGM, HCG, HCGS  HEPATIC: No results found for: ALBUMIN, PROTTOTAL, ALT, AST, GGT, ALKPHOS, BILITOTAL, BILIDIRECT, VIDHYA  OTHER:   Lab Results   Component Value Date    JAMES 9.3 11/07/2022       Anesthesia Plan    ASA Status:  2   NPO Status:  NPO Appropriate    Anesthesia Type: MAC.   Induction: Intravenous, Propofol.   Maintenance: TIVA.        Consents    Anesthesia Plan(s) and associated risks, benefits, and realistic alternatives discussed. Questions answered and patient/representative(s) expressed understanding.    - Discussed:     - Discussed with:  Patient      - Extended Intubation/Ventilatory Support Discussed: No.      - Patient is DNR/DNI Status: No    Use of blood products discussed: No .     Postoperative Care            Comments:    Other Comments: MAC, sedation, GA as back up, standard ASA monitors  All pertinent results and records reviewed, risks, included but not limited to hypoventilation, hypoxemia, laryngo/bronchospasm, N/V, intraoperative awareness due to sedation only d/w patient, all questions, concerns addressed            Amita Brink MD

## 2023-02-24 ENCOUNTER — HOSPITAL ENCOUNTER (OUTPATIENT)
Facility: AMBULATORY SURGERY CENTER | Age: 48
Discharge: HOME | End: 2023-02-24
Attending: INTERNAL MEDICINE
Payer: COMMERCIAL

## 2023-02-24 ENCOUNTER — ANESTHESIA (OUTPATIENT)
Dept: SURGERY | Facility: AMBULATORY SURGERY CENTER | Age: 48
End: 2023-02-24
Payer: COMMERCIAL

## 2023-02-24 VITALS
SYSTOLIC BLOOD PRESSURE: 114 MMHG | OXYGEN SATURATION: 94 % | DIASTOLIC BLOOD PRESSURE: 73 MMHG | RESPIRATION RATE: 16 BRPM | TEMPERATURE: 96.8 F | HEART RATE: 69 BPM

## 2023-02-24 VITALS — HEART RATE: 71 BPM

## 2023-02-24 LAB — COLONOSCOPY: NORMAL

## 2023-02-24 PROCEDURE — 88305 TISSUE EXAM BY PATHOLOGIST: CPT | Performed by: PATHOLOGY

## 2023-02-24 PROCEDURE — 45385 COLONOSCOPY W/LESION REMOVAL: CPT

## 2023-02-24 PROCEDURE — G8918 PT W/O PREOP ORDER IV AB PRO: HCPCS

## 2023-02-24 PROCEDURE — 45380 COLONOSCOPY AND BIOPSY: CPT | Mod: XS

## 2023-02-24 PROCEDURE — G8907 PT DOC NO EVENTS ON DISCHARG: HCPCS

## 2023-02-24 RX ORDER — ONDANSETRON 4 MG/1
4 TABLET, ORALLY DISINTEGRATING ORAL EVERY 30 MIN PRN
Status: DISCONTINUED | OUTPATIENT
Start: 2023-02-24 | End: 2023-02-25 | Stop reason: HOSPADM

## 2023-02-24 RX ORDER — FLUMAZENIL 0.1 MG/ML
0.2 INJECTION, SOLUTION INTRAVENOUS
Status: ACTIVE | OUTPATIENT
Start: 2023-02-24 | End: 2023-02-24

## 2023-02-24 RX ORDER — ONDANSETRON 2 MG/ML
4 INJECTION INTRAMUSCULAR; INTRAVENOUS EVERY 30 MIN PRN
Status: DISCONTINUED | OUTPATIENT
Start: 2023-02-24 | End: 2023-02-25 | Stop reason: HOSPADM

## 2023-02-24 RX ORDER — LIDOCAINE 40 MG/G
CREAM TOPICAL
Status: DISCONTINUED | OUTPATIENT
Start: 2023-02-24 | End: 2023-02-25 | Stop reason: HOSPADM

## 2023-02-24 RX ORDER — PROPOFOL 10 MG/ML
INJECTION, EMULSION INTRAVENOUS PRN
Status: DISCONTINUED | OUTPATIENT
Start: 2023-02-24 | End: 2023-02-24

## 2023-02-24 RX ORDER — FENTANYL CITRATE 50 UG/ML
50 INJECTION, SOLUTION INTRAMUSCULAR; INTRAVENOUS EVERY 5 MIN PRN
Status: DISCONTINUED | OUTPATIENT
Start: 2023-02-24 | End: 2023-02-25 | Stop reason: HOSPADM

## 2023-02-24 RX ORDER — SODIUM CHLORIDE, SODIUM LACTATE, POTASSIUM CHLORIDE, CALCIUM CHLORIDE 600; 310; 30; 20 MG/100ML; MG/100ML; MG/100ML; MG/100ML
INJECTION, SOLUTION INTRAVENOUS CONTINUOUS
Status: DISCONTINUED | OUTPATIENT
Start: 2023-02-24 | End: 2023-02-25 | Stop reason: HOSPADM

## 2023-02-24 RX ORDER — NALOXONE HYDROCHLORIDE 0.4 MG/ML
0.2 INJECTION, SOLUTION INTRAMUSCULAR; INTRAVENOUS; SUBCUTANEOUS
Status: DISCONTINUED | OUTPATIENT
Start: 2023-02-24 | End: 2023-02-25 | Stop reason: HOSPADM

## 2023-02-24 RX ORDER — NALOXONE HYDROCHLORIDE 0.4 MG/ML
0.4 INJECTION, SOLUTION INTRAMUSCULAR; INTRAVENOUS; SUBCUTANEOUS
Status: DISCONTINUED | OUTPATIENT
Start: 2023-02-24 | End: 2023-02-25 | Stop reason: HOSPADM

## 2023-02-24 RX ORDER — LIDOCAINE HYDROCHLORIDE 20 MG/ML
INJECTION, SOLUTION INFILTRATION; PERINEURAL PRN
Status: DISCONTINUED | OUTPATIENT
Start: 2023-02-24 | End: 2023-02-24

## 2023-02-24 RX ORDER — ONDANSETRON 2 MG/ML
4 INJECTION INTRAMUSCULAR; INTRAVENOUS EVERY 6 HOURS PRN
Status: DISCONTINUED | OUTPATIENT
Start: 2023-02-24 | End: 2023-02-25 | Stop reason: HOSPADM

## 2023-02-24 RX ORDER — ONDANSETRON 2 MG/ML
4 INJECTION INTRAMUSCULAR; INTRAVENOUS
Status: DISCONTINUED | OUTPATIENT
Start: 2023-02-24 | End: 2023-02-25 | Stop reason: HOSPADM

## 2023-02-24 RX ORDER — ONDANSETRON 4 MG/1
4 TABLET, ORALLY DISINTEGRATING ORAL EVERY 6 HOURS PRN
Status: DISCONTINUED | OUTPATIENT
Start: 2023-02-24 | End: 2023-02-25 | Stop reason: HOSPADM

## 2023-02-24 RX ORDER — FENTANYL CITRATE 50 UG/ML
25 INJECTION, SOLUTION INTRAMUSCULAR; INTRAVENOUS EVERY 5 MIN PRN
Status: DISCONTINUED | OUTPATIENT
Start: 2023-02-24 | End: 2023-02-25 | Stop reason: HOSPADM

## 2023-02-24 RX ORDER — PROCHLORPERAZINE MALEATE 10 MG
10 TABLET ORAL EVERY 6 HOURS PRN
Status: DISCONTINUED | OUTPATIENT
Start: 2023-02-24 | End: 2023-02-25 | Stop reason: HOSPADM

## 2023-02-24 RX ADMIN — PROPOFOL 150 MCG/KG/MIN: 10 INJECTION, EMULSION INTRAVENOUS at 08:52

## 2023-02-24 RX ADMIN — SODIUM CHLORIDE, SODIUM LACTATE, POTASSIUM CHLORIDE, CALCIUM CHLORIDE: 600; 310; 30; 20 INJECTION, SOLUTION INTRAVENOUS at 08:13

## 2023-02-24 RX ADMIN — LIDOCAINE HYDROCHLORIDE 60 MG: 20 INJECTION, SOLUTION INFILTRATION; PERINEURAL at 08:50

## 2023-02-24 RX ADMIN — PROPOFOL 70 MG: 10 INJECTION, EMULSION INTRAVENOUS at 08:50

## 2023-02-24 NOTE — OR NURSING
Pt discharged to home via wheelchair with NA. Pt and SO given discharge instructions. Both verbalized understanding.

## 2023-02-24 NOTE — H&P
[facility] Anesthesia Pre-op History and Physical    Simone Shepard MRN# [id number]   Age: 47 year old YOB: 1975            Date of Exam 2/24/2023         Primary care provider: Sal[facility] - RAJ[facility] [place]         Chief Complaint and/or Reason for Procedure:     CRC screening - first colonoscopy         Active problem list:     There are no problems to display for this patient.           Medications (include herbals and vitamins):   Any Plavix use in the last 7 days? No     No current outpatient medications on file.     Current Facility-Administered Medications   Medication     fentaNYL (PF) (SUBLIMAZE) injection 25 mcg     fentaNYL (PF) (SUBLIMAZE) injection 50 mcg     HYDROmorphone (DILAUDID) injection 0.2 mg     HYDROmorphone (DILAUDID) injection 0.4 mg     lactated ringers infusion     lactated ringers infusion     lactated ringers infusion     lidocaine (LMX4) kit     lidocaine (LMX4) kit     lidocaine 1 % 0.1-1 mL     lidocaine 1 % 0.1-1 mL     ondansetron (ZOFRAN ODT) ODT tab 4 mg    Or     ondansetron (ZOFRAN) injection 4 mg     ondansetron (ZOFRAN) injection 4 mg     prochlorperazine (COMPAZINE) injection 5 mg     sodium chloride (PF) 0.9% PF flush 3 mL     sodium chloride (PF) 0.9% PF flush 3 mL     sodium chloride (PF) 0.9% PF flush 3 mL     sodium chloride (PF) 0.9% PF flush 3 mL             Allergies:    No Known Allergies  Allergy to Latex? No  Allergy to tape?   No  Intolerances:             Physical Exam:   All vitals have been reviewed  Patient Vitals for the past 8 hrs:   Temp Temp src Resp SpO2   02/24/23 0700 96.8  F (36  C) Temporal 16 98 %     No intake/output data recorded.  Lungs:   No increased work of breathing, good air exchange, clear to auscultation bilaterally, no crackles or wheezing     Cardiovascular:   normal S1 and S2             Lab / Radiology Results:            Anesthetic risk and/or ASA classification:   1    Cinda Dunn DO

## 2023-02-24 NOTE — ANESTHESIA CARE TRANSFER NOTE
Patient: Simone Shepard    Procedure: Procedure(s):  COLONOSCOPY, WITH CO2 INSUFFLATION  COLONOSCOPY, WITH POLYPECTOMY AND BIOPSY  COLONOSCOPY, FLEXIBLE, WITH LESION REMOVAL USING SNARE  COLONOSCOPY, WITH HEMORRHAGE CONTROL       Diagnosis: Screen for colon cancer [Z12.11]  Diagnosis Additional Information: No value filed.    Anesthesia Type:   MAC     Note:    Oropharynx: oropharynx clear of all foreign objects and spontaneously breathing  Level of Consciousness: awake and drowsy  Oxygen Supplementation: room air    Independent Airway: airway patency satisfactory and stable  Dentition: dentition unchanged  Vital Signs Stable: post-procedure vital signs reviewed and stable  Report to RN Given: handoff report given  Patient transferred to: Phase II    Handoff Report: Identifed the Patient, Identified the Reponsible Provider, Reviewed the pertinent medical history, Discussed the surgical course, Reviewed Intra-OP anesthesia mangement and issues during anesthesia, Set expectations for post-procedure period and Allowed opportunity for questions and acknowledgement of understanding      Vitals:  Vitals Value Taken Time   BP     Temp     Pulse 71 02/24/23 0915   Resp     SpO2         Electronically Signed By: ALANNAH Bates CRNA  February 24, 2023  9:18 AM

## 2023-02-24 NOTE — ANESTHESIA POSTPROCEDURE EVALUATION
Patient: Simone Shepard    Procedure: Procedure(s):  COLONOSCOPY, WITH CO2 INSUFFLATION  COLONOSCOPY, WITH POLYPECTOMY AND BIOPSY  COLONOSCOPY, FLEXIBLE, WITH LESION REMOVAL USING SNARE  COLONOSCOPY, WITH HEMORRHAGE CONTROL       Anesthesia Type:  MAC    Note:  Disposition: Outpatient   Postop Pain Control: Uneventful            Sign Out: Well controlled pain   PONV: No   Neuro/Psych: Uneventful            Sign Out: Acceptable/Baseline neuro status   Airway/Respiratory: Uneventful            Sign Out: Acceptable/Baseline resp. status   CV/Hemodynamics: Uneventful            Sign Out: Acceptable CV status; No obvious hypovolemia; No obvious fluid overload   Other NRE: NONE   DID A NON-ROUTINE EVENT OCCUR? No           Last vitals:  Vitals Value Taken Time   BP 93/58 02/24/23 0919   Temp     Pulse 49 02/24/23 0919   Resp 16 02/24/23 0919   SpO2 96 % 02/24/23 0919       Electronically Signed By: Amita Brink MD  February 24, 2023  9:45 AM

## 2023-02-28 LAB
PATH REPORT.COMMENTS IMP SPEC: NORMAL
PATH REPORT.COMMENTS IMP SPEC: NORMAL
PATH REPORT.FINAL DX SPEC: NORMAL
PATH REPORT.GROSS SPEC: NORMAL
PATH REPORT.MICROSCOPIC SPEC OTHER STN: NORMAL
PATH REPORT.RELEVANT HX SPEC: NORMAL
PHOTO IMAGE: NORMAL

## 2023-06-21 ENCOUNTER — OFFICE VISIT (OUTPATIENT)
Dept: FAMILY MEDICINE | Facility: CLINIC | Age: 48
End: 2023-06-21
Payer: COMMERCIAL

## 2023-06-21 VITALS
WEIGHT: 239 LBS | BODY MASS INDEX: 34.22 KG/M2 | DIASTOLIC BLOOD PRESSURE: 78 MMHG | SYSTOLIC BLOOD PRESSURE: 127 MMHG | TEMPERATURE: 97.8 F | OXYGEN SATURATION: 95 % | HEART RATE: 69 BPM | RESPIRATION RATE: 16 BRPM | HEIGHT: 70 IN

## 2023-06-21 DIAGNOSIS — B07.0 PLANTAR WARTS: Primary | ICD-10-CM

## 2023-06-21 PROCEDURE — 17110 DESTRUCTION B9 LES UP TO 14: CPT | Performed by: PHYSICIAN ASSISTANT

## 2023-06-21 ASSESSMENT — PAIN SCALES - GENERAL: PAINLEVEL: MILD PAIN (3)

## 2023-06-21 NOTE — PROGRESS NOTES
"  Assessment & Plan     (B07.0) Plantar warts  (primary encounter diagnosis)  Comment: The patient presents for evaluation of wart on the right foot.  Patient is requesting cryotherapy.  Wart was pared down with 15 blade.  3 freeze  thaw cycles performed.  Patient tolerated procedure well.  Follow-up in the next 4 weeks as needed.  Discussed warning signs to watch for.      RYLAN [facility]   Simone is a 48 year old, presenting for the following health issues:  Wart (Planter wart in right foot. )         No data to display              History of Present Illness       Reason for visit:  Plantar's wart  Symptom onset:  More than a month  Symptoms include:  A foot owie  Symptom intensity:  Mild  Symptom progression:  Staying the same  Had these symptoms before:  Yes  Has tried/received treatment for these symptoms:  Yes  Previous treatment was successful:  Yes  Prior treatment description:  Removal  What makes it worse:  Walking and standing  What makes it better:  Removal    He eats 2-3 servings of fruits and vegetables daily.He consumes 1 sweetened beverage(s) daily.He exercises with enough effort to increase his heart rate 9 or less minutes per day.  He exercises with enough effort to increase his heart rate 3 or less days per week.   He is taking medications regularly.     The patient has been dealing with a plantar wart for the last few weeks to months.  Irritation of the right foot.  Has not had any redness or warmth to the site.  Has had similar warts in the past.     Review of Systems   Constitutional, HEENT, cardiovascular, pulmonary, gi and gu systems are negative, except as otherwise noted.      Objective    /78   Pulse 69   Temp 97.8  F (36.6  C) (Tympanic)   Resp 16   Ht 1.778 m (5' 10\")   Wt 108.4 kg (239 lb)   SpO2 95%   BMI 34.29 kg/m    Body mass index is 34.29 kg/m .  Physical Exam   GENERAL: healthy, alert and no distress  RESP: " lungs clear to auscultation - no rales, rhonchi or wheezes  CV: regular rate and rhythm, normal S1 S2, no S3 or S4, no murmur, click or rub, no peripheral edema and peripheral pulses strong  MS: no gross musculoskeletal defects noted, no edema  SKIN: wart plantar aspect right foot.    SKIN:   Location:  Right foot plantar aspect there is a 0.3 mm sized sized skin colored cauliflower textured lesion that is consistent with a wart.     Procedure: Initially pared down with 15 blade.  Wart was treated with liquid nitrogen cryotherapy x 3.  Patient tolerated well with minimal discomfort.  Discusses signs and symptoms to monitor for including redness, pain, or other signs of infection.  Continue to use OTC topicals after two days.  Return in 4 weeks for re-treatment in indicated.

## 2023-07-31 ENCOUNTER — OFFICE VISIT (OUTPATIENT)
Dept: FAMILY MEDICINE | Facility: CLINIC | Age: 48
End: 2023-07-31
Payer: COMMERCIAL

## 2023-07-31 VITALS
BODY MASS INDEX: 33.71 KG/M2 | TEMPERATURE: 97.6 F | RESPIRATION RATE: 14 BRPM | OXYGEN SATURATION: 97 % | DIASTOLIC BLOOD PRESSURE: 70 MMHG | HEIGHT: 71 IN | HEART RATE: 77 BPM | SYSTOLIC BLOOD PRESSURE: 122 MMHG | WEIGHT: 240.8 LBS

## 2023-07-31 DIAGNOSIS — B07.0 PLANTAR WARTS: Primary | ICD-10-CM

## 2023-07-31 PROCEDURE — 99213 OFFICE O/P EST LOW 20 MIN: CPT | Mod: 25 | Performed by: PHYSICIAN ASSISTANT

## 2023-07-31 PROCEDURE — 17110 DESTRUCTION B9 LES UP TO 14: CPT | Performed by: PHYSICIAN ASSISTANT

## 2023-07-31 ASSESSMENT — PAIN SCALES - GENERAL: PAINLEVEL: NO PAIN (0)

## 2023-07-31 NOTE — PATIENT INSTRUCTIONS
Hector Nichols,    Thank you for allowing [facility] to manage your care.    If you have any questions or concerns, please feel free to call us at [phone]    Sincerely,    Lavon Barragan PA-C    Did you know?      You can schedule a video visit for follow-up appointments as well as future appointments for certain conditions.  Please see the below link.     [email or web address]    If you have not already done so,  I encourage you to sign up for [employer] ([email or web address]).  This will allow you to review your results, securely communicate with a provider, and schedule virtual visits as well.

## 2023-07-31 NOTE — PROGRESS NOTES
"  Assessment & Plan   Problem List Items Addressed This Visit    None  Visit Diagnoses       Plantar warts    -  Primary           Wart treated as below with good tolerance. No signs of bacterial superinfection. Discussed otc treatments, wound care and follow up in one month if not improving.     Complete history and physical exam as below. Afebrile with normal vital signs.    DDx and Dx discussed with and explained to the pt to their satisfaction.  All questions were answered at this time. Pt expressed understanding of and agreement with this dx, tx, and plan. No further workup warranted and standard medication warnings given. I have given the patient a list of pertinent indications for re-evaluation. Will go to the Emergency Department if symptoms worsen or new concerning symptoms arise. Patient left in no apparent distress.     BMI:   Estimated body mass index is 33.34 kg/m  as calculated from the following:    Height as of this encounter: 1.81 m (5' 11.26\").    Weight as of this encounter: 109.2 kg (240 lb 12.8 oz).     See Patient Instructions    KEYA Woodson [facility] BROWN Nichols is a 48 year old, presenting for the following health issues:  Plantar Wart (R foot)        7/31/2023     7:25 AM   Additional Questions   Roomed by Eloy STARR   Accompanied by NA         7/31/2023     7:25 AM   Patient Reported Additional Medications   Patient reports taking the following new medications NA       History of Present Illness       Reason for visit:  Plantar wart removal    He eats 2-3 servings of fruits and vegetables daily.He consumes 1 sweetened beverage(s) daily.He exercises with enough effort to increase his heart rate 9 or less minutes per day.  He exercises with enough effort to increase his heart rate 3 or less days per week.   He is taking medications regularly.     Plantar wart to the right foot was not improved with cryotherapy one months ago. Hoping to be retreated " "today. 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 Mild pain that is non-radiating.    Review of Systems   Constitutional, derm, neuro, cardiovascular, pulmonary systems are negative, except as otherwise noted.      Objective    /70   Pulse 77   Temp 97.6  F (36.4  C) (Temporal)   Resp 14   Ht 1.81 m (5' 11.26\")   Wt 109.2 kg (240 lb 12.8 oz)   SpO2 97%   BMI 33.34 kg/m    Body mass index is 33.34 kg/m .  Physical Exam  Vitals and nursing note reviewed.   Constitutional:       General: He is not in acute distress.     Appearance: Normal appearance. He is not diaphoretic.   HENT:      Head: Normocephalic and atraumatic.      Nose: Nose normal.   Eyes:      Conjunctiva/sclera: Conjunctivae normal.   Pulmonary:      Effort: Pulmonary effort is normal. No respiratory distress.   Skin:     General: Skin is dry.      Coloration: Skin is not jaundiced or pale.      Comments: RLE: firm flesh colored depression that appears most consistent with a plantar wart is located at the lateral plantar aspect of the midfoot.  No overlying signs of trauma or infection.   Neurological:      General: No focal deficit present.      Mental Status: He is alert. Mental status is at baseline.   Psychiatric:         Mood and Affect: Mood normal.         Behavior: Behavior normal.          Procedure: Wart cryotherapy    Verbal consent was obtained from patient after a discussion of the risks and benefits. Area was prepped with alcohol and/or betadine. Liquid nitrogen was applied using a spray gun with good tolerance to the lesion. 4 rounds of freeze/thaw after paring with a #15 blade. No blood loss. Pt tolerated the procedure well.  There were no complications.                "

## 2023-09-11 NOTE — PROGRESS NOTES
Called out to pt lvm to call back. Pt does have an appt on books.        Future Appointments   Date Time Provider [address]  [address]   10/5/2023 10:15 AM DO RJ He OREG RJ LEYVA C   10/12/2023 10:30 AM [facility]   5/14/2024 11:00 AM Vaishali Nichols MD [facility] SUBJECTIVE:   CC: Simone is an 47 year old who presents for preventative health visit.     Due for first colon cancer screening.   Denies family history of colon or prostate cancer.    He is fasting today, would like to check routine labs.  Interested in a vasectomy.   He snores.  Wife states he has had apneic periods. Denies daytime fatigue.        Patient has been advised of split billing requirements and indicates understanding: Yes  Healthy Habits:     Getting at least 3 servings of Calcium per day:  Yes    Bi-annual eye exam:  NO    Dental care twice a year:  NO    Sleep apnea or symptoms of sleep apnea:  Excessive snoring    Diet:  Regular (no restrictions)    Frequency of exercise:  2-3 days/week    Duration of exercise:  15-30 minutes    Taking medications regularly:  Yes    Medication side effects:  Not applicable    PHQ-2 Total Score: 0    Additional concerns today:  Yes      Today's PHQ-2 Score:   PHQ-2 ( 1999 [employer]) 11/7/2022   Q1: Little interest or pleasure in doing things 0   Q2: Feeling down, depressed or hopeless 0   PHQ-2 Score 0   PHQ-2 Total Score (12-17 Years)- Positive if 3 or more points; Administer PHQ-A if positive -   Q1: Little interest or pleasure in doing things Not at all   Q2: Feeling down, depressed or hopeless Not at all   PHQ-2 Score 0       Abuse: Current or Past(Physical, Sexual or Emotional)- No  Do you feel safe in your environment? Yes    Have you ever done Advance Care Planning? (For example, a Health Directive, POLST, or a discussion with a medical provider or your loved ones about your wishes):     Social History     Tobacco Use     Smoking status: Never     Smokeless tobacco: Never   Substance Use Topics     Alcohol use: Yes     Comment: rarely     If you drink alcohol do you typically have >3 drinks per day or >7 drinks per week? No    Alcohol Use 11/7/2022   Prescreen: >3 drinks/day or >7 drinks/week? No   Prescreen: >3 drinks/day or >7 drinks/week? -       Last PSA: No  "results found for: PSA    Reviewed orders with patient. Reviewed health maintenance and updated orders accordingly - Yes      Reviewed and updated as needed this visit by clinical staff   Tobacco  Allergies  Meds  Problems  Med Hx  Surg Hx  Fam Hx          Reviewed and updated as needed this visit by Provider   Tobacco  Allergies  Meds  Problems  Med Hx  Surg Hx  Fam Hx             Review of Systems   Constitutional: Negative for chills and fever.   HENT: Positive for sore throat. Negative for congestion, ear pain and hearing loss.    Eyes: Negative for pain and visual disturbance.   Respiratory: Negative for cough and shortness of breath.    Cardiovascular: Negative for chest pain, palpitations and peripheral edema.   Gastrointestinal: Negative for abdominal pain, constipation, diarrhea, heartburn, hematochezia and nausea.   Genitourinary: Negative for dysuria, frequency, genital sores, hematuria, impotence, penile discharge and urgency.   Musculoskeletal: Negative for arthralgias, joint swelling and myalgias.   Skin: Negative for rash.   Neurological: Negative for dizziness, weakness, headaches and paresthesias.   Psychiatric/Behavioral: Negative for mood changes. The patient is not nervous/anxious.        OBJECTIVE:   /77   Pulse 93   Temp 97.5  F (36.4  C)   Ht 1.778 m (5' 10\")   Wt 100.2 kg (221 lb)   SpO2 97%   BMI 31.71 kg/m      Physical Exam  GENERAL: healthy, alert and no distress  EYES: Eyes grossly normal to inspection, PERRL and conjunctivae and sclerae normal  HENT: ear canals and TM's normal, nose and mouth without ulcers or lesions  NECK: no adenopathy, no asymmetry, masses, or scars and thyroid normal to palpation  RESP: lungs clear to auscultation - no rales, rhonchi or wheezes  CV: regular rate and rhythm, normal S1 S2, no S3 or S4, no murmur, click or rub, no peripheral edema and peripheral pulses strong  ABDOMEN: soft, nontender, no hepatosplenomegaly, no masses and " "bowel sounds normal  MS: no gross musculoskeletal defects noted, no edema  SKIN: no suspicious lesions or rashes  NEURO: Normal strength and tone, mentation intact and speech normal  PSYCH: mentation appears normal, affect normal/bright    Diagnostic Test Results:  Labs reviewed in Epic    ASSESSMENT/PLAN:   (Z00.00) Routine general medical examination at a health care facility  (primary encounter diagnosis)  Comment:   Plan: continue annual physicals    (R06.83) Snoring  Comment: referral for possible EVI  Plan: Adult Sleep Eval & Management          Referral          (Z12.11) Screen for colon cancer  Comment:   Plan: Colonoscopy Screening  Referral          (Z13.220) Screening for hyperlipidemia  Comment:   Plan: Lipid panel reflex to direct LDL Non-fasting          (Z13.1) Screening for diabetes mellitus  Comment:   Plan: Basic metabolic panel  (Ca, Cl, CO2, Creat,         Gluc, K, Na, BUN)          (Z11.4) Screening for HIV (human immunodeficiency virus)  Comment:   Plan: HIV Antigen Antibody Combo          (Z11.59) Need for hepatitis C screening test  Comment:   Plan: Hepatitis C Screen Reflex to HCV RNA Quant and         Genotype            Patient has been advised of split billing requirements and indicates understanding: Yes      COUNSELING:   Reviewed preventive health counseling, as reflected in patient instructions    Estimated body mass index is 31.71 kg/m  as calculated from the following:    Height as of this encounter: 1.778 m (5' 10\").    Weight as of this encounter: 100.2 kg (221 lb).     Weight management plan: Lifestyle measures    He reports that he has never smoked. He has never used smokeless tobacco.      Counseling Resources:  ATP IV Guidelines  Pooled Cohorts Equation Calculator  FRAX Risk Assessment  ICSI Preventive Guidelines  Dietary Guidelines for Americans, 2010  USDA's MyPlate  ASA Prophylaxis  Lung CA Screening    Raquel Ward, CNP  [facility] " [place]

## 2023-12-17 ENCOUNTER — HEALTH MAINTENANCE LETTER (OUTPATIENT)
Age: 48
End: 2023-12-17

## 2024-03-21 ENCOUNTER — OFFICE VISIT (OUTPATIENT)
Dept: FAMILY MEDICINE | Facility: CLINIC | Age: 49
End: 2024-03-21
Payer: COMMERCIAL

## 2024-03-21 VITALS
HEIGHT: 71 IN | BODY MASS INDEX: 33.46 KG/M2 | RESPIRATION RATE: 16 BRPM | DIASTOLIC BLOOD PRESSURE: 79 MMHG | SYSTOLIC BLOOD PRESSURE: 123 MMHG | WEIGHT: 239 LBS | OXYGEN SATURATION: 99 % | HEART RATE: 91 BPM

## 2024-03-21 DIAGNOSIS — M54.50 ACUTE RIGHT-SIDED LOW BACK PAIN WITHOUT SCIATICA: Primary | ICD-10-CM

## 2024-03-21 PROCEDURE — 99214 OFFICE O/P EST MOD 30 MIN: CPT | Performed by: INTERNAL MEDICINE

## 2024-03-21 RX ORDER — METHOCARBAMOL 750 MG/1
750 TABLET, FILM COATED ORAL 4 TIMES DAILY PRN
Qty: 30 TABLET | Refills: 0 | Status: SHIPPED | OUTPATIENT
Start: 2024-03-21

## 2024-03-21 RX ORDER — NAPROXEN 500 MG/1
500 TABLET ORAL 2 TIMES DAILY WITH MEALS
Qty: 60 TABLET | Refills: 0 | Status: SHIPPED | OUTPATIENT
Start: 2024-03-21

## 2024-03-21 ASSESSMENT — PAIN SCALES - GENERAL: PAINLEVEL: MODERATE PAIN (5)

## 2024-03-21 ASSESSMENT — ENCOUNTER SYMPTOMS: BACK PAIN: 1

## 2024-03-21 NOTE — PROGRESS NOTES
"  Assessment & Plan     Acute right-sided low back pain without sciatica  He has no history of any back problems  Around 2 weeks ago started having low back pain  It is on the right side  He noticed that the pain started a day or so after he was running on the treadmill  He does admit that, he not only walks on treadmill but also does back walking on some side walking  Apparently  on that day he started doing this after taking a long break from doing the treadmill  In any event he started having pain in the lower back  The pain is on the right lower side  Does not go down to his buttock  Stretching makes it worse  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 which makes the pain worse  No prior history of any trauma  Examination shows that he has got some paraspinal muscle tenderness in the right lower back  Most probably he has got a muscle sprain/strain  Unlikely that this is a issue of the spine as such  No evidence to suggest that he has got any arthritis or spondylosis of the spine  Reassured  Advised to follow conservative measures including some exercises and take anti-inflammatories on Monday relaxants as needed  Going forward we can certainly consider physical therapy if symptoms do not improve  - naproxen (NAPROSYN) 500 MG tablet; Take 1 tablet (500 mg) by mouth 2 times daily (with meals)  - methocarbamol (ROBAXIN) 750 MG tablet; Take 1 tablet (750 mg) by mouth 4 times daily as needed for muscle spasms      30 minutes spent by me on the date of the encounter doing chart review, history and exam, documentation and further activities per the note      BMI  Estimated body mass index is 33.33 kg/m  as calculated from the following:    Height as of this encounter: 1.803 m (5' 11\").    Weight as of this encounter: 108.4 kg (239 lb).             Alana Nichols is a 49 year old, presenting for the following health issues:  Back Pain      3/21/2024     8:56 AM   Additional " "Questions   Roomed by DM   Accompanied by self     Back Pain     History of Present Illness       Back Pain:  He presents for follow up of back pain. Patient's back pain is a new problem.    Original cause of back pain: running  First noticed back pain: 1-4 weeks ago  Patient feels back pain: 2 to 4 times per dayLocation of back pain:  Right lower back  Description of back pain: stabbing  Back pain spreads: right buttocks    Since patient first noticed back pain, pain is: gradually improving  Does back pain interfere with his job:  Yes  On a scale of 1-10 (10 being the worst), patient describes pain as:  5  What makes back pain worse: bending, lying down and twisting   Acupuncture: not tried  Acetaminophen: not tried  Activity or exercise: helpful  Chiropractor:  Not tried  Cold: helpful  Heat: helpful  Massage: not tried  Muscle relaxants: not tried  NSAIDS: helpful  Opioids: not tried  Physical Therapy: not tried  Rest: helpful  Steroid Injection: not tried  Stretching: helpful  Surgery: not tried  TENS unit: not tried  Topical pain relievers: not tried  Other healthcare providers patient is seeing for back pain: None    He eats 2-3 servings of fruits and vegetables daily.He consumes 0 sweetened beverage(s) daily.He exercises with enough effort to increase his heart rate 20 to 29 minutes per day.  He exercises with enough effort to increase his heart rate 5 days per week.   He is taking medications regularly.                 Review of Systems  Constitutional, HEENT, cardiovascular, pulmonary, gi and gu systems are negative, except as otherwise noted.      Objective    /79 (BP Location: Left arm, Patient Position: Sitting)   Pulse 91   Resp 16   Ht 1.803 m (5' 11\")   Wt 108.4 kg (239 lb)   SpO2 99%   BMI 33.33 kg/m    Body mass index is 33.33 kg/m .  Physical Exam   GENERAL: alert and no distress  NECK: no adenopathy, no asymmetry, masses, or scars  RESP: lungs clear to auscultation - no rales, rhonchi " or wheezes  CV: regular rate and rhythm, normal S1 S2, no S3 or S4, no murmur, click or rub, no peripheral edema  MS: Tender in the right lower back  Tenderness is in the paraspinal area on the right side of L4-L5  Straight leg raising test negative on both sides  Reflexes normal  Spinal movements are normal  SKIN: no suspicious lesions or rashes  NEURO: Normal strength and tone, mentation intact and speech normal            Signed Electronically by: Huber Reynolds MD

## 2024-05-05 ENCOUNTER — PATIENT OUTREACH (OUTPATIENT)
Dept: CARE COORDINATION | Facility: CLINIC | Age: 49
End: 2024-05-05
Payer: COMMERCIAL

## 2024-09-09 ENCOUNTER — OFFICE VISIT (OUTPATIENT)
Dept: FAMILY MEDICINE | Facility: CLINIC | Age: 49
End: 2024-09-09
Payer: COMMERCIAL

## 2024-09-09 VITALS
WEIGHT: 236.4 LBS | TEMPERATURE: 97.8 F | RESPIRATION RATE: 16 BRPM | HEIGHT: 70 IN | HEART RATE: 67 BPM | DIASTOLIC BLOOD PRESSURE: 77 MMHG | OXYGEN SATURATION: 97 % | SYSTOLIC BLOOD PRESSURE: 134 MMHG | BODY MASS INDEX: 33.84 KG/M2

## 2024-09-09 DIAGNOSIS — R10.13 EPIGASTRIC PAIN: Primary | ICD-10-CM

## 2024-09-09 DIAGNOSIS — K80.20 GALLSTONES: ICD-10-CM

## 2024-09-09 LAB
BASOPHILS # BLD AUTO: 0 10E3/UL (ref 0–0.2)
BASOPHILS NFR BLD AUTO: 0 %
EOSINOPHIL # BLD AUTO: 0.1 10E3/UL (ref 0–0.7)
EOSINOPHIL NFR BLD AUTO: 1 %
ERYTHROCYTE [DISTWIDTH] IN BLOOD BY AUTOMATED COUNT: 12.9 % (ref 10–15)
HCT VFR BLD AUTO: 44.9 % (ref 40–53)
HGB BLD-MCNC: 15.5 G/DL (ref 13.3–17.7)
IMM GRANULOCYTES # BLD: 0 10E3/UL
IMM GRANULOCYTES NFR BLD: 0 %
LYMPHOCYTES # BLD AUTO: 1.9 10E3/UL (ref 0.8–5.3)
LYMPHOCYTES NFR BLD AUTO: 27 %
MCH RBC QN AUTO: 30.8 PG (ref 26.5–33)
MCHC RBC AUTO-ENTMCNC: 34.5 G/DL (ref 31.5–36.5)
MCV RBC AUTO: 89 FL (ref 78–100)
MONOCYTES # BLD AUTO: 0.7 10E3/UL (ref 0–1.3)
MONOCYTES NFR BLD AUTO: 9 %
NEUTROPHILS # BLD AUTO: 4.5 10E3/UL (ref 1.6–8.3)
NEUTROPHILS NFR BLD AUTO: 63 %
PLATELET # BLD AUTO: 209 10E3/UL (ref 150–450)
RBC # BLD AUTO: 5.04 10E6/UL (ref 4.4–5.9)
WBC # BLD AUTO: 7.1 10E3/UL (ref 4–11)

## 2024-09-09 PROCEDURE — 85025 COMPLETE CBC W/AUTO DIFF WBC: CPT | Performed by: PHYSICIAN ASSISTANT

## 2024-09-09 PROCEDURE — 99214 OFFICE O/P EST MOD 30 MIN: CPT | Performed by: PHYSICIAN ASSISTANT

## 2024-09-09 PROCEDURE — 80053 COMPREHEN METABOLIC PANEL: CPT | Performed by: PHYSICIAN ASSISTANT

## 2024-09-09 PROCEDURE — 93000 ELECTROCARDIOGRAM COMPLETE: CPT | Performed by: PHYSICIAN ASSISTANT

## 2024-09-09 PROCEDURE — 87338 HPYLORI STOOL AG IA: CPT | Performed by: PHYSICIAN ASSISTANT

## 2024-09-09 PROCEDURE — 83690 ASSAY OF LIPASE: CPT | Performed by: PHYSICIAN ASSISTANT

## 2024-09-09 PROCEDURE — 36415 COLL VENOUS BLD VENIPUNCTURE: CPT | Performed by: PHYSICIAN ASSISTANT

## 2024-09-09 ASSESSMENT — PAIN SCALES - GENERAL: PAINLEVEL: SEVERE PAIN (6)

## 2024-09-09 NOTE — PROGRESS NOTES
Assessment & Plan     (R10.13) Epigastric pain  (primary encounter diagnosis)  Comment: Patient presents for evaluation of epigastric pain.  Has had 3 days of discomfort.  This is waxed and waned in severity.  Considered potential gastritis, peptic ulcer, cardiac equivalent, amongst others.  This does appear most consistent with gastritis given exacerbation with dietary triggers.  Discussed with patient possibility of H. pylori.  EKG ordered given epigastric pain fortunately unremarkable for any acute pathology.  Again symptoms seem dietary triggered per patient report.  He will trial omeprazole once daily for the next 30 days.  Follow-up in that time for recheck.  Discussed potential endoscopy if no improvement of symptoms.  Patient was comfortable with plan.  Advised to discontinue marijuana use.  Plan: CBC with platelets and differential,         Comprehensive metabolic panel (BMP + Alb, Alk         Phos, ALT, AST, Total. Bili, TP), Lipase, EKG         12-lead complete w/read - Clinics, Helicobacter        pylori Antigen Stool, US Abdomen Complete,         omeprazole (PRILOSEC) 20 MG DR capsule               Subjective   Simone is a 49 year old, presenting for the following health issues:  Epigastric Pain (Epigastric Pain since Friday of Last Week.)      9/9/2024    10:26 AM   Additional Questions   Roomed by FLORENCIA CONTE   Accompanied by SELF     History of Present Illness       Reason for visit:  Dull, persistent stomach/lower chest pain  Symptom onset:  1-3 days ago  Symptoms include:  Dull ache in upper stomach/lower chest, nausea, fatigue  Symptom intensity:  Moderate  Symptom progression:  Staying the same  Had these symptoms before:  No  What makes it better:  Laying on my stomach   He is taking medications regularly.     3 days ago the patient developed epigastric abdominal discomfort.  Friday morning he had consumed baked chicken.  Afterwards he felt some slight abdominal discomfort and bloating.   "Took a nap in his car.  With this decreased appetite.  Had exacerbation of epigastric pain with a dull achy sensation.  No change in discomfort with positions.  Later that day he was nauseated without any vomiting.  No change in stool habits.  2 days ago after having coffee had exacerbation of abdominal discomfort.  Try to consume soup and crackers which resulted in nausea and vomiting.  6/10 severity of discomfort at that time.  Did have another bout of vomiting.  Yesterday was feeling slight improvement did have overall decrease in appetite.  For lunch that day had a sparkling water, hot tub, chips without any vomiting.  Later that evening consumed alcohol and after 4 ounces had exacerbation of abdominal symptoms.  Has been no blood in the stool.  No recent antibiotic use.  Patient has trialed Pepto-Bismol without any significant relief.  Continues to feel bloated mild epigastric discomfort.  Has not had any prior similar episodes.  No prior surgeries to the abdomen.  Of note, patient does endorse that he has been smoking marijuana near daily over the last few weeks.  With this he has had increased appetite.  Has been eating more in the late afternoons.      Review of Systems  Constitutional, HEENT, cardiovascular, pulmonary, gi and gu systems are negative, except as otherwise noted.      Objective    /77   Pulse 67   Temp 97.8  F (36.6  C) (Tympanic)   Resp 16   Ht 1.778 m (5' 10\")   Wt 107.2 kg (236 lb 6.4 oz)   SpO2 97%   BMI 33.92 kg/m    Body mass index is 33.92 kg/m .  Physical Exam   GENERAL: alert and no distress  HENT: normal cephalic/atraumatic, nose and mouth without ulcers or lesions, oropharynx clear, and oral mucous membranes moist  RESP: lungs clear to auscultation - no rales, rhonchi or wheezes  CV: regular rate and rhythm, normal S1 S2, no S3 or S4, no murmur, click or rub, no peripheral edema  ABDOMEN: Minimal epigastric tenderness.  No rebound or guarding.  Bowel sounds normal, " hernia umbilical reducible  MS: no gross musculoskeletal defects noted, no edema  PSYCH: mentation appears normal, affect normal/bright    EKG - Reviewed and interpreted by me appears normal, NSR, normal axis, normal intervals, no acute ST/T changes c/w ischemia, unchanged from previous tracings        Signed Electronically by: Brenton Vaz PA-C

## 2024-09-10 ENCOUNTER — ANCILLARY PROCEDURE (OUTPATIENT)
Dept: ULTRASOUND IMAGING | Facility: CLINIC | Age: 49
End: 2024-09-10
Attending: PHYSICIAN ASSISTANT
Payer: COMMERCIAL

## 2024-09-10 DIAGNOSIS — R10.13 EPIGASTRIC PAIN: ICD-10-CM

## 2024-09-10 LAB
ALBUMIN SERPL BCG-MCNC: 4.2 G/DL (ref 3.5–5.2)
ALP SERPL-CCNC: 65 U/L (ref 40–150)
ALT SERPL W P-5'-P-CCNC: 21 U/L (ref 0–70)
ANION GAP SERPL CALCULATED.3IONS-SCNC: 7 MMOL/L (ref 7–15)
AST SERPL W P-5'-P-CCNC: 23 U/L (ref 0–45)
BILIRUB SERPL-MCNC: 0.7 MG/DL
BUN SERPL-MCNC: 10.6 MG/DL (ref 6–20)
CALCIUM SERPL-MCNC: 9.4 MG/DL (ref 8.8–10.4)
CHLORIDE SERPL-SCNC: 104 MMOL/L (ref 98–107)
CREAT SERPL-MCNC: 0.93 MG/DL (ref 0.67–1.17)
EGFRCR SERPLBLD CKD-EPI 2021: >90 ML/MIN/1.73M2
GLUCOSE SERPL-MCNC: 105 MG/DL (ref 70–99)
H PYLORI AG STL QL IA: NEGATIVE
HCO3 SERPL-SCNC: 28 MMOL/L (ref 22–29)
LIPASE SERPL-CCNC: 23 U/L (ref 13–60)
POTASSIUM SERPL-SCNC: 5.4 MMOL/L (ref 3.4–5.3)
PROT SERPL-MCNC: 6.7 G/DL (ref 6.4–8.3)
SODIUM SERPL-SCNC: 139 MMOL/L (ref 135–145)

## 2024-09-10 PROCEDURE — 76700 US EXAM ABDOM COMPLETE: CPT | Mod: TC | Performed by: INTERNAL MEDICINE

## 2024-09-11 ENCOUNTER — MYC MEDICAL ADVICE (OUTPATIENT)
Dept: FAMILY MEDICINE | Facility: CLINIC | Age: 49
End: 2024-09-11
Payer: COMMERCIAL

## 2024-10-23 ENCOUNTER — TELEPHONE (OUTPATIENT)
Dept: SURGERY | Facility: CLINIC | Age: 49
End: 2024-10-23

## 2024-10-23 ENCOUNTER — OFFICE VISIT (OUTPATIENT)
Dept: SURGERY | Facility: CLINIC | Age: 49
End: 2024-10-23
Attending: PHYSICIAN ASSISTANT
Payer: COMMERCIAL

## 2024-10-23 VITALS
BODY MASS INDEX: 34.15 KG/M2 | DIASTOLIC BLOOD PRESSURE: 76 MMHG | WEIGHT: 238 LBS | SYSTOLIC BLOOD PRESSURE: 116 MMHG | HEART RATE: 98 BPM

## 2024-10-23 DIAGNOSIS — R10.13 EPIGASTRIC PAIN: ICD-10-CM

## 2024-10-23 DIAGNOSIS — K80.50 BILIARY COLIC: Primary | ICD-10-CM

## 2024-10-23 DIAGNOSIS — K80.20 GALLSTONES: ICD-10-CM

## 2024-10-23 PROCEDURE — 99244 OFF/OP CNSLTJ NEW/EST MOD 40: CPT | Performed by: SURGERY

## 2024-10-23 NOTE — LETTER
10/23/2024      Simone Shepard  [address] [facility] [address]      Dear Colleague,    Thank you for referring your patient, Simone Shepard, to the [facility] [place]. Please see a copy of my visit note below.    Patient seen in consultation for gallbladder by Brenton Vaz PA-C     HPI:  Patient is a 49 year old male  with complaints of epigastric pain  The patient noticed the symptoms about 1 month ago.    Pain was about 6/10, lasted about a week improving the last half or so of it. Some associated fatigue and loss of appetite. Had light colored stool as well  Now getting occasional flare-ups but not severe. Can sometimes feel something in right shoulder blade area  Had US done showing gallstone.   time makes the episode better. Tried some diet alterations also  Patient has not family history of gallbladder problems    Review Of Systems    Skin: negative  Ears/Nose/Throat: negative  Respiratory: No shortness of breath, dyspnea on exertion, cough, or hemoptysis  Cardiovascular: negative  Gastrointestinal: as above  Genitourinary: negative  Musculoskeletal: negative  Neurologic: negative  Hematologic/Lymphatic/Immunologic: negative  Endocrine: negative      No past medical history on file.      Past Surgical History:   Procedure Laterality Date     COLONOSCOPY N/A 2/24/2023    Procedure: COLONOSCOPY, WITH POLYPECTOMY AND BIOPSY;  Surgeon: Cinda Dunn DO;  Location: [facility] OR     COLONOSCOPY N/A 2/24/2023    Procedure: COLONOSCOPY, FLEXIBLE, WITH LESION REMOVAL USING SNARE;  Surgeon: Cinda Dunn DO;  Location: [facility]     COLONOSCOPY WITH CO2 INSUFFLATION N/A 2/24/2023    Procedure: COLONOSCOPY, WITH CO2 INSUFFLATION;  Surgeon: Cinda Dunn DO;  Location: [facility] OR       Social History     Socioeconomic History     Marital status:      Spouse name: Not on file     Number of children: Not on file     Years of education: Not on file     Highest education level: Not on  file   Occupational History     Not on file   Tobacco Use     Smoking status: Never     Passive exposure: Never     Smokeless tobacco: Never   Vaping Use     Vaping status: Never Used   Substance and Sexual Activity     Alcohol use: Yes     Comment: rarely     Drug use: No     Sexual activity: Yes   Other Topics Concern     Not on file   Social History Narrative     Not on file     Social Drivers of Health     Financial Resource Strain: Not on file   Food Insecurity: Not on file   Transportation Needs: Not on file   Physical Activity: Not on file   Stress: Not on file   Social Connections: Not on file   Interpersonal Safety: Low Risk  (3/21/2024)    Interpersonal Safety      Do you feel physically and emotionally safe where you currently live?: Yes      Within the past 12 months, have you been hit, slapped, kicked or otherwise physically hurt by someone?: No      Within the past 12 months, have you been humiliated or emotionally abused in other ways by your partner or ex-partner?: No   Housing Stability: Not on file       Current Outpatient Medications   Medication Sig Dispense Refill     methocarbamol (ROBAXIN) 750 MG tablet Take 1 tablet (750 mg) by mouth 4 times daily as needed for muscle spasms (Patient not taking: Reported on 9/9/2024) 30 tablet 0     naproxen (NAPROSYN) 500 MG tablet Take 1 tablet (500 mg) by mouth 2 times daily (with meals) (Patient not taking: Reported on 9/9/2024) 60 tablet 0     omeprazole (PRILOSEC) 20 MG DR capsule Take 1 capsule (20 mg) by mouth daily. 30 capsule 0       Medications and history reviewed    Physical exam:  Vitals: /76   Pulse 98   Wt 108 kg (238 lb)   BMI 34.15 kg/m    BMI= Body mass index is 34.15 kg/m .    Constitutional: healthy, alert, and no distress  Head: Normocephalic. No masses, lesions, tenderness or abnormalities  Gastrointestinal: Abdomen soft, non-tender. BS normal. No masses, organomegaly, positive findings: obese  : Deferred  Musculoskeletal:  extremities normal- no gross deformities noted, gait normal, and normal muscle tone  Skin: no suspicious lesions or rashes  Psychiatric: mentation appears normal and affect normal/bright    Labs show:   Latest Reference Range & Units 09/09/24 11:12   Sodium 135 - 145 mmol/L 139   Potassium 3.4 - 5.3 mmol/L 5.4 (H)   Chloride 98 - 107 mmol/L 104   Carbon Dioxide (CO2) 22 - 29 mmol/L 28   Urea Nitrogen 6.0 - 20.0 mg/dL 10.6   Creatinine 0.67 - 1.17 mg/dL 0.93   GFR Estimate >60 mL/min/1.73m2 >90   Calcium 8.8 - 10.4 mg/dL 9.4   Anion Gap 7 - 15 mmol/L 7   Albumin 3.5 - 5.2 g/dL 4.2   Protein Total 6.4 - 8.3 g/dL 6.7   Alkaline Phosphatase 40 - 150 U/L 65   ALT 0 - 70 U/L 21   AST 0 - 45 U/L 23   Bilirubin Total <=1.2 mg/dL 0.7   Glucose 70 - 99 mg/dL 105 (H)   Lipase 13 - 60 U/L 23   WBC 4.0 - 11.0 10e3/uL 7.1   Hemoglobin 13.3 - 17.7 g/dL 15.5   Hematocrit 40.0 - 53.0 % 44.9   Platelet Count 150 - 450 10e3/uL 209   RBC Count 4.40 - 5.90 10e6/uL 5.04   MCV 78 - 100 fL 89   MCH 26.5 - 33.0 pg 30.8   MCHC 31.5 - 36.5 g/dL 34.5   RDW 10.0 - 15.0 % 12.9   % Neutrophils % 63   % Lymphocytes % 27   % Monocytes % 9   % Eosinophils % 1   % Basophils % 0   Absolute Basophils 0.0 - 0.2 10e3/uL 0.0   Absolute Eosinophils 0.0 - 0.7 10e3/uL 0.1   Absolute Immature Granulocytes <=0.4 10e3/uL 0.0   Absolute Lymphocytes 0.8 - 5.3 10e3/uL 1.9   Absolute Monocytes 0.0 - 1.3 10e3/uL 0.7   % Immature Granulocytes % 0   Absolute Neutrophils 1.6 - 8.3 10e3/uL 4.5   (H): Data is abnormally high    Imaging shows:  US ABDOMEN COMPLETE 9/10/2024 7:54 AM     CLINICAL HISTORY: Epigastric pain     TECHNIQUE: Complete abdominal ultrasound.     COMPARISON: None.     FINDINGS:     GALLBLADDER: Probable 5 mm gallstone within the gallbladder neck. No  pericholecystic fluid. Negative sonographic Singer sign. No  gallbladder wall thickening.     BILE DUCTS: No biliary dilatation. The common duct measures 5 mm.     LIVER: Mildly increased echogenicity of  the hepatic parenchyma. No  focal mass.     RIGHT KIDNEY: Normal size. Normal echogenicity with no hydronephrosis  or mass.      LEFT KIDNEY: Normal size. Normal echogenicity with no hydronephrosis  or mass. Echogenic structure of the interpolar kidney measuring 4 mm.     SPLEEN: Normal.     PANCREAS: The visualized portions are normal.     AORTA: Normal in caliber.      IVC: Normal where visualized.     No ascites.                                                                      IMPRESSION:  1.  Probable gallstone within the gallbladder neck. No sonographic  findings of acute cholecystitis.  2.  Possible nonobstructing 5 mm left renal calculus.  3.  Mild hepatic steatosis.    Assessment:     ICD-10-CM    1. Biliary colic  K80.50 Case Request: CHOLECYSTECTOMY, LAPAROSCOPIC      2. Epigastric pain  R10.13 Adult Gen Surg  Referral     Case Request: CHOLECYSTECTOMY, LAPAROSCOPIC      3. Gallstones  K80.20 Adult Gen Surg  Referral     Case Request: CHOLECYSTECTOMY, LAPAROSCOPIC        Plan: Discussed imaging findings and what to expect with surgery. Risks of bleeding, infection, anesthesia complications, conversion to open, injury to nearby structures and bile duct injury all discussed.   We went over my discharge instructions and post-op restrictions.  Patient questions answered and we will schedule the procedure.  .    James Herman MD      Again, thank you for allowing me to participate in the care of your patient.        Sincerely,        James Herman MD

## 2024-10-23 NOTE — PROGRESS NOTES
Patient seen in consultation for gallbladder by Brenton Vaz PA-C     HPI:  Patient is a 49 year old male  with complaints of epigastric pain  The patient noticed the symptoms about 1 month ago.    Pain was about 6/10, lasted about a week improving the last half or so of it. Some associated fatigue and loss of appetite. Had light colored stool as well  Now getting occasional flare-ups but not severe. Can sometimes feel something in right shoulder blade area  Had US done showing gallstone.   time makes the episode better. Tried some diet alterations also  Patient has not family history of gallbladder problems    Review Of Systems    Skin: negative  Ears/Nose/Throat: negative  Respiratory: No shortness of breath, dyspnea on exertion, cough, or hemoptysis  Cardiovascular: negative  Gastrointestinal: as above  Genitourinary: negative  Musculoskeletal: negative  Neurologic: negative  Hematologic/Lymphatic/Immunologic: negative  Endocrine: negative      No past medical history on file.      Past Surgical History:   Procedure Laterality Date    COLONOSCOPY N/A 2/24/2023    Procedure: COLONOSCOPY, WITH POLYPECTOMY AND BIOPSY;  Surgeon: Cinda Dunn DO;  Location: [facility]    COLONOSCOPY N/A 2/24/2023    Procedure: COLONOSCOPY, FLEXIBLE, WITH LESION REMOVAL USING SNARE;  Surgeon: Cinda Dunn DO;  Location: [facility]    COLONOSCOPY WITH CO2 INSUFFLATION N/A 2/24/2023    Procedure: COLONOSCOPY, WITH CO2 INSUFFLATION;  Surgeon: Cinda Dunn DO;  Location: [facility]       Social History     Socioeconomic History    Marital status:      Spouse name: Not on file    Number of children: Not on file    Years of education: Not on file    Highest education level: Not on file   Occupational History    Not on file   Tobacco Use    Smoking status: Never     Passive exposure: Never    Smokeless tobacco: Never   Vaping Use    Vaping status: Never Used   Substance and Sexual Activity    Alcohol use: Yes     Comment:  rarely    Drug use: No    Sexual activity: Yes   Other Topics Concern    Not on file   Social History Narrative    Not on file     Social Drivers of Health     Financial Resource Strain: Not on file   Food Insecurity: Not on file   Transportation Needs: Not on file   Physical Activity: Not on file   Stress: Not on file   Social Connections: Not on file   Interpersonal Safety: Low Risk  (3/21/2024)    Interpersonal Safety     Do you feel physically and emotionally safe where you currently live?: Yes     Within the past 12 months, have you been hit, slapped, kicked or otherwise physically hurt by someone?: No     Within the past 12 months, have you been humiliated or emotionally abused in other ways by your partner or ex-partner?: No   Housing Stability: Not on file       Current Outpatient Medications   Medication Sig Dispense Refill    methocarbamol (ROBAXIN) 750 MG tablet Take 1 tablet (750 mg) by mouth 4 times daily as needed for muscle spasms (Patient not taking: Reported on 9/9/2024) 30 tablet 0    naproxen (NAPROSYN) 500 MG tablet Take 1 tablet (500 mg) by mouth 2 times daily (with meals) (Patient not taking: Reported on 9/9/2024) 60 tablet 0    omeprazole (PRILOSEC) 20 MG DR capsule Take 1 capsule (20 mg) by mouth daily. 30 capsule 0       Medications and history reviewed    Physical exam:  Vitals: /76   Pulse 98   Wt 108 kg (238 lb)   BMI 34.15 kg/m    BMI= Body mass index is 34.15 kg/m .    Constitutional: healthy, alert, and no distress  Head: Normocephalic. No masses, lesions, tenderness or abnormalities  Gastrointestinal: Abdomen soft, non-tender. BS normal. No masses, organomegaly, positive findings: obese  : Deferred  Musculoskeletal: extremities normal- no gross deformities noted, gait normal, and normal muscle tone  Skin: no suspicious lesions or rashes  Psychiatric: mentation appears normal and affect normal/bright    Labs show:   Latest Reference Range & Units 09/09/24 11:12   Sodium 135  - 145 mmol/L 139   Potassium 3.4 - 5.3 mmol/L 5.4 (H)   Chloride 98 - 107 mmol/L 104   Carbon Dioxide (CO2) 22 - 29 mmol/L 28   Urea Nitrogen 6.0 - 20.0 mg/dL 10.6   Creatinine 0.67 - 1.17 mg/dL 0.93   GFR Estimate >60 mL/min/1.73m2 >90   Calcium 8.8 - 10.4 mg/dL 9.4   Anion Gap 7 - 15 mmol/L 7   Albumin 3.5 - 5.2 g/dL 4.2   Protein Total 6.4 - 8.3 g/dL 6.7   Alkaline Phosphatase 40 - 150 U/L 65   ALT 0 - 70 U/L 21   AST 0 - 45 U/L 23   Bilirubin Total <=1.2 mg/dL 0.7   Glucose 70 - 99 mg/dL 105 (H)   Lipase 13 - 60 U/L 23   WBC 4.0 - 11.0 10e3/uL 7.1   Hemoglobin 13.3 - 17.7 g/dL 15.5   Hematocrit 40.0 - 53.0 % 44.9   Platelet Count 150 - 450 10e3/uL 209   RBC Count 4.40 - 5.90 10e6/uL 5.04   MCV 78 - 100 fL 89   MCH 26.5 - 33.0 pg 30.8   MCHC 31.5 - 36.5 g/dL 34.5   RDW 10.0 - 15.0 % 12.9   % Neutrophils % 63   % Lymphocytes % 27   % Monocytes % 9   % Eosinophils % 1   % Basophils % 0   Absolute Basophils 0.0 - 0.2 10e3/uL 0.0   Absolute Eosinophils 0.0 - 0.7 10e3/uL 0.1   Absolute Immature Granulocytes <=0.4 10e3/uL 0.0   Absolute Lymphocytes 0.8 - 5.3 10e3/uL 1.9   Absolute Monocytes 0.0 - 1.3 10e3/uL 0.7   % Immature Granulocytes % 0   Absolute Neutrophils 1.6 - 8.3 10e3/uL 4.5   (H): Data is abnormally high    Imaging shows:  US ABDOMEN COMPLETE 9/10/2024 7:54 AM     CLINICAL HISTORY: Epigastric pain     TECHNIQUE: Complete abdominal ultrasound.     COMPARISON: None.     FINDINGS:     GALLBLADDER: Probable 5 mm gallstone within the gallbladder neck. No  pericholecystic fluid. Negative sonographic Singer sign. No  gallbladder wall thickening.     BILE DUCTS: No biliary dilatation. The common duct measures 5 mm.     LIVER: Mildly increased echogenicity of the hepatic parenchyma. No  focal mass.     RIGHT KIDNEY: Normal size. Normal echogenicity with no hydronephrosis  or mass.      LEFT KIDNEY: Normal size. Normal echogenicity with no hydronephrosis  or mass. Echogenic structure of the interpolar kidney  measuring 4 mm.     SPLEEN: Normal.     PANCREAS: The visualized portions are normal.     AORTA: Normal in caliber.      IVC: Normal where visualized.     No ascites.                                                                      IMPRESSION:  1.  Probable gallstone within the gallbladder neck. No sonographic  findings of acute cholecystitis.  2.  Possible nonobstructing 5 mm left renal calculus.  3.  Mild hepatic steatosis.    Assessment:     ICD-10-CM    1. Biliary colic  K80.50 Case Request: CHOLECYSTECTOMY, LAPAROSCOPIC      2. Epigastric pain  R10.13 Adult Gen Surg  Referral     Case Request: CHOLECYSTECTOMY, LAPAROSCOPIC      3. Gallstones  K80.20 Adult Gen Surg  Referral     Case Request: CHOLECYSTECTOMY, LAPAROSCOPIC        Plan: Discussed imaging findings and what to expect with surgery. Risks of bleeding, infection, anesthesia complications, conversion to open, injury to nearby structures and bile duct injury all discussed.   We went over my discharge instructions and post-op restrictions.  Patient questions answered and we will schedule the procedure.  .    James Herman MD

## 2024-12-23 ENCOUNTER — OFFICE VISIT (OUTPATIENT)
Dept: FAMILY MEDICINE | Facility: CLINIC | Age: 49
End: 2024-12-23
Payer: COMMERCIAL

## 2024-12-23 VITALS
TEMPERATURE: 97.1 F | SYSTOLIC BLOOD PRESSURE: 122 MMHG | OXYGEN SATURATION: 98 % | RESPIRATION RATE: 12 BRPM | HEIGHT: 70 IN | DIASTOLIC BLOOD PRESSURE: 73 MMHG | BODY MASS INDEX: 35.42 KG/M2 | HEART RATE: 75 BPM | WEIGHT: 247.4 LBS

## 2024-12-23 DIAGNOSIS — Z11.3 SCREENING EXAMINATION FOR VENEREAL DISEASE: Primary | ICD-10-CM

## 2024-12-23 LAB
HIV 1+2 AB+HIV1 P24 AG SERPL QL IA: NONREACTIVE
T PALLIDUM AB SER QL: NONREACTIVE

## 2024-12-23 PROCEDURE — 87389 HIV-1 AG W/HIV-1&-2 AB AG IA: CPT

## 2024-12-23 PROCEDURE — 36415 COLL VENOUS BLD VENIPUNCTURE: CPT

## 2024-12-23 PROCEDURE — 99213 OFFICE O/P EST LOW 20 MIN: CPT

## 2024-12-23 PROCEDURE — 86780 TREPONEMA PALLIDUM: CPT

## 2024-12-23 PROCEDURE — 87491 CHLMYD TRACH DNA AMP PROBE: CPT

## 2024-12-23 PROCEDURE — 87591 N.GONORRHOEAE DNA AMP PROB: CPT

## 2024-12-23 ASSESSMENT — PAIN SCALES - GENERAL: PAINLEVEL_OUTOF10: NO PAIN (0)

## 2024-12-23 NOTE — PROGRESS NOTES
Assessment & Plan     (Z11.3) Screening examination for venereal disease  (primary encounter diagnosis)  Comment: Acute and stable.  No specific physical symptoms today or known exposure.  After collaborative discussion between patient and NP, patient elected to move forward with pan STI screening today.  Encouraged him to abstain from any sexual contact until screening is back, and of course to disclose any positive testing to his sexual partners so that they can be tested and treated appropriately as well.  Patient has no further questions today, but discussed with him that we would talk further about treatment for sexually transmitted infections based on any possible test results.  Will update the plan of care based on those findings.  Educated patient on return to clinic criteria as well as red flag alarm signs that would warrant the need for immediate medical attention.  Patient verbalized understanding and agreement.  Plan: Chlamydia trachomatis/Neisseria gonorrhoeae by         PCR (first-voided urine), HIV Antigen Antibody         Combo Cascade, Treponema Abs w Reflex to RPR         and Titer      This progress note has been dictated, with use of voice recognition software. Any grammatical, typographical, or context errors are unintentional and inherent to use of voice recognition software.     Ordering of each unique test  I spent a total of 9 minutes on the day of the visit.   Time spent by me today doing chart review, history and exam, documentation and further activities per the note    FUTURE APPOINTMENTS:       - Follow-up for annual visit or as needed  Patient Instructions   Safer Sex: Care Instructions  Overview  Safer sex is a way to reduce your risk of getting a sexually transmitted infection (STI). It can also help prevent pregnancy.  Several products can help you practice safer sex and reduce your chance of STIs. One of the best is a condom. There are internal and external condoms. You can use a  special rubber sheet (dental dam) for protection during oral sex. Disposable gloves can keep your hands from touching blood, semen, or other body fluids that can carry infections.  Remember that birth control methods such as diaphragms, IUDs, foams, and birth control pills do not stop you from getting STIs.  Follow-up care is a key part of your treatment and safety. Be sure to make and go to all appointments, and call your doctor if you are having problems. It's also a good idea to know your test results and keep a list of the medicines you take.  How can you care for yourself at home?  Think about getting vaccinated to help prevent hepatitis A, hepatitis B, and human papillomavirus (HPV). They can be spread through sex.  Use a condom every time you have sex. Use an external condom, which goes on the penis. Or use an internal condom, which goes into the vagina or anus.  Make sure you use the right size external condom. A condom that's too small can break easily. A condom that's too big can slip off during sex.  Use a new condom each time you have sex. Be careful not to poke a hole in the condom when you open the wrapper.  Don't use an internal condom and an external condom at the same time.  Never use petroleum jelly (such as Vaseline), grease, hand lotion, baby oil, or anything with oil in it. These products can make holes in the condom.  After intercourse, hold the edge of the condom as you remove it. This will help keep semen from spilling out of the condom.  Do not have sex with anyone who has symptoms of an STI, such as sores on the genitals or mouth.  Do not drink a lot of alcohol or use drugs before sex.  Limit your sex partners. Sex with one partner who has sex only with you can reduce your risk of getting an STI.  Don't share sex toys. But if you do share them, use a condom and clean the sex toys between each use.  Talk to any partners before you have sex. Talk about what you feel comfortable with and  "whether you have any boundaries with sex. And find out if your partner or partners may be at risk for any STI. Keep in mind that a person may be able to spread an STI even if they do not have symptoms. You and any partners may want to get tested for STIs.  Where can you learn more?  Go to https://www.[employer].net/patiented  Enter B608 in the search box to learn more about \"Safer Sex: Care Instructions.\"  Current as of: April 30, 2024  Content Version: 14.3    2024 [employer].   Care instructions adapted under license by your healthcare professional. If you have questions about a medical condition or this instruction, always ask your healthcare professional. [employer] disclaims any warranty or liability for your use of this information.       Subjective   Simone is a 49 year old, presenting for the following health issues:  office visit (Patient reports they are here for general STI testing. )        12/23/2024     7:14 AM   Additional Questions   Roomed by Milan   Accompanied by alone         12/23/2024     7:14 AM   Patient Reported Additional Medications   Patient reports taking the following new medications none     History of Present Illness       Reason for visit:  STD screening   He is taking medications regularly.  Simone is a 49-year-old male with no significant past medical history who presents today with desire for STI screening.  Patient reports that he presents today with no symptoms.  He has no concerns for abnormal discharge from the penis, known exposure to sexually transmitted infection, swelling or redness of the penis or scrotum, pain in his genital area, abdominal pain, nausea, fever, chills, body aches, fatigue, dysuria or hematuria, or open sores or lesions in his genital area.  Patient reports that he has been in a monogamous heterosexual relationship as the insertive partner of penile vaginal sex for about 10 years.  He is going through divorce, and was told by his " "spouse that she has been seeing another man.  He is unsure as to whether or not there has been sexual contact between them.  He has not been sexually active with anybody else.    Review of Systems  Constitutional, HEENT, cardiovascular, pulmonary, gi and gu systems are negative, except as otherwise noted.      Objective    /73 (BP Location: Left arm, Patient Position: Sitting, Cuff Size: Adult Regular)   Pulse 75   Temp 97.1  F (36.2  C) (Tympanic)   Resp 12   Ht 1.778 m (5' 10\")   Wt 112.2 kg (247 lb 6.4 oz)   SpO2 98%   BMI 35.50 kg/m    Body mass index is 35.5 kg/m .  Physical Exam   GENERAL: healthy, alert and no distress  NECK: no adenopathy, no asymmetry, masses, or scars  RESP: Easy rate, depth, and effort of breathing.  No visible use of accessory muscles.  No rapid respiratory rate or increased work of breathing appreciated.  CV: peripheral edema, clubbing, or cyanosis  MS: no gross musculoskeletal defects noted, no edema  NEURO: Normal strength and tone, mentation intact and speech normal  : declined genital examination    Harleen Solomon DNP FNP-C  Family Nurse Practitioner - Same Day Provider  [facility]        Signed Electronically by: ALANNAH Major CNP    "

## 2024-12-23 NOTE — PATIENT INSTRUCTIONS
Safer Sex: Care Instructions  Overview  Safer sex is a way to reduce your risk of getting a sexually transmitted infection (STI). It can also help prevent pregnancy.  Several products can help you practice safer sex and reduce your chance of STIs. One of the best is a condom. There are internal and external condoms. You can use a special rubber sheet (dental dam) for protection during oral sex. Disposable gloves can keep your hands from touching blood, semen, or other body fluids that can carry infections.  Remember that birth control methods such as diaphragms, IUDs, foams, and birth control pills do not stop you from getting STIs.  Follow-up care is a key part of your treatment and safety. Be sure to make and go to all appointments, and call your doctor if you are having problems. It's also a good idea to know your test results and keep a list of the medicines you take.  How can you care for yourself at home?  Think about getting vaccinated to help prevent hepatitis A, hepatitis B, and human papillomavirus (HPV). They can be spread through sex.  Use a condom every time you have sex. Use an external condom, which goes on the penis. Or use an internal condom, which goes into the vagina or anus.  Make sure you use the right size external condom. A condom that's too small can break easily. A condom that's too big can slip off during sex.  Use a new condom each time you have sex. Be careful not to poke a hole in the condom when you open the wrapper.  Don't use an internal condom and an external condom at the same time.  Never use petroleum jelly (such as Vaseline), grease, hand lotion, baby oil, or anything with oil in it. These products can make holes in the condom.  After intercourse, hold the edge of the condom as you remove it. This will help keep semen from spilling out of the condom.  Do not have sex with anyone who has symptoms of an STI, such as sores on the genitals or mouth.  Do not drink a lot of alcohol or  "use drugs before sex.  Limit your sex partners. Sex with one partner who has sex only with you can reduce your risk of getting an STI.  Don't share sex toys. But if you do share them, use a condom and clean the sex toys between each use.  Talk to any partners before you have sex. Talk about what you feel comfortable with and whether you have any boundaries with sex. And find out if your partner or partners may be at risk for any STI. Keep in mind that a person may be able to spread an STI even if they do not have symptoms. You and any partners may want to get tested for STIs.  Where can you learn more?  Go to https://www.[facility].net/patiented  Enter B608 in the search box to learn more about \"Safer Sex: Care Instructions.\"  Current as of: April 30, 2024  Content Version: 14.3    2024 [employer].   Care instructions adapted under license by your healthcare professional. If you have questions about a medical condition or this instruction, always ask your healthcare professional. [employer] disclaims any warranty or liability for your use of this information.    "

## 2024-12-24 LAB
C TRACH DNA SPEC QL PROBE+SIG AMP: NEGATIVE
N GONORRHOEA DNA SPEC QL NAA+PROBE: NEGATIVE

## 2025-01-12 ENCOUNTER — HEALTH MAINTENANCE LETTER (OUTPATIENT)
Age: 50
End: 2025-01-12

## 2025-03-27 ENCOUNTER — OFFICE VISIT (OUTPATIENT)
Dept: FAMILY MEDICINE | Facility: CLINIC | Age: 50
End: 2025-03-27
Payer: COMMERCIAL

## 2025-03-27 VITALS
HEART RATE: 78 BPM | SYSTOLIC BLOOD PRESSURE: 112 MMHG | BODY MASS INDEX: 34.22 KG/M2 | DIASTOLIC BLOOD PRESSURE: 67 MMHG | RESPIRATION RATE: 20 BRPM | TEMPERATURE: 97.8 F | HEIGHT: 70 IN | OXYGEN SATURATION: 98 % | WEIGHT: 239 LBS

## 2025-03-27 DIAGNOSIS — H65.92 OME (OTITIS MEDIA WITH EFFUSION), LEFT: Primary | ICD-10-CM

## 2025-03-27 PROCEDURE — 3078F DIAST BP <80 MM HG: CPT | Performed by: PHYSICIAN ASSISTANT

## 2025-03-27 PROCEDURE — 3074F SYST BP LT 130 MM HG: CPT | Performed by: PHYSICIAN ASSISTANT

## 2025-03-27 PROCEDURE — 1126F AMNT PAIN NOTED NONE PRSNT: CPT | Performed by: PHYSICIAN ASSISTANT

## 2025-03-27 PROCEDURE — 99213 OFFICE O/P EST LOW 20 MIN: CPT | Performed by: PHYSICIAN ASSISTANT

## 2025-03-27 RX ORDER — FLUTICASONE PROPIONATE 50 MCG
1 SPRAY, SUSPENSION (ML) NASAL DAILY
Qty: 16 G | Refills: 0 | Status: SHIPPED | OUTPATIENT
Start: 2025-03-27

## 2025-03-27 ASSESSMENT — PAIN SCALES - GENERAL: PAINLEVEL_OUTOF10: NO PAIN (0)

## 2025-03-27 NOTE — PROGRESS NOTES
"  {PROVIDER CHARTING PREFERENCE:059461}    Subjective   Simone is a 50 year old, presenting for the following health issues:  Ear Problem (Possible wax in the left ear)      3/27/2025     4:17 PM   Additional Questions   Roomed by karoline   Accompanied by self         3/27/2025     4:17 PM   Patient Reported Additional Medications   Patient reports taking the following new medications none     Ear Problem    History of Present Illness       Reason for visit:  Clogged ears   He is taking medications regularly.        {MA/LPN/RN Pre-Provider Visit Orders- hCG/UA/Strep (Optional):654915}  {SUPERLIST (Optional):177890}  {additonal problems for provider to add (Optional):476915}    {ROS Picklists (Optional):741579}      Objective    /67   Pulse 78   Temp 97.8  F (36.6  C) (Tympanic)   Resp 20   Ht 1.778 m (5' 10\")   Wt 108.4 kg (239 lb)   SpO2 98%   BMI 34.29 kg/m    Body mass index is 34.29 kg/m .  Physical Exam   {Exam List (Optional):091238}    {Diagnostic Test Results (Optional):937718}        Signed Electronically by: SVEN SMILEY PA-C  {Email feedback regarding this note to [email or web address]   :[id number]}  "   Cardiovascular:      Rate and Rhythm: Normal rate and regular rhythm.   Pulmonary:      Effort: Pulmonary effort is normal.      Breath sounds: Normal breath sounds.   Lymphadenopathy:      Cervical: No cervical adenopathy.   Neurological:      General: No focal deficit present.      Mental Status: He is alert and oriented to person, place, and time.   Psychiatric:         Mood and Affect: Mood normal.         Behavior: Behavior normal.         Thought Content: Thought content normal.         Judgment: Judgment normal.            Signed Electronically by: SVEN SMILEY PA-C

## (undated) DEVICE — PAD CHUX UNDERPAD 23X24" 7136

## (undated) DEVICE — PREP CHLORAPREP 26ML TINTED ORANGE  260815

## (undated) DEVICE — KIT ENDO FIRST STEP DISINFECTANT 200ML W/POUCH EP-4